# Patient Record
Sex: FEMALE | Race: WHITE | Employment: OTHER | URBAN - METROPOLITAN AREA
[De-identification: names, ages, dates, MRNs, and addresses within clinical notes are randomized per-mention and may not be internally consistent; named-entity substitution may affect disease eponyms.]

---

## 2017-01-12 ENCOUNTER — HOSPITAL ENCOUNTER (OUTPATIENT)
Dept: MAMMOGRAPHY | Age: 62
Discharge: HOME | End: 2017-01-12
Attending: OBSTETRICS & GYNECOLOGY
Payer: MEDICARE

## 2017-01-12 DIAGNOSIS — Z12.31 VISIT FOR SCREENING MAMMOGRAM: ICD-10-CM

## 2017-01-12 PROCEDURE — 77067 SCR MAMMO BI INCL CAD: CPT

## 2017-04-27 PROBLEM — E03.9 ACQUIRED HYPOTHYROIDISM: Chronic | Status: ACTIVE | Noted: 2017-04-27

## 2017-04-27 PROBLEM — E78.00 PURE HYPERCHOLESTEROLEMIA: Chronic | Status: ACTIVE | Noted: 2017-04-27

## 2017-07-24 ENCOUNTER — HOSPITAL ENCOUNTER (OUTPATIENT)
Dept: CT IMAGING | Age: 62
Discharge: HOME | End: 2017-07-24
Attending: SURGERY
Payer: MEDICARE

## 2017-07-24 VITALS — HEIGHT: 67 IN | BODY MASS INDEX: 21.19 KG/M2 | WEIGHT: 135 LBS

## 2017-07-24 DIAGNOSIS — E78.00 PURE HYPERCHOLESTEROLEMIA: Chronic | ICD-10-CM

## 2017-07-24 DIAGNOSIS — I75.022 ATHEROEMBOLISM OF LEFT LOWER EXTREMITY (HCC): Chronic | ICD-10-CM

## 2017-07-24 DIAGNOSIS — I73.00 RAYNAUD'S PHENOMENON WITHOUT GANGRENE: Chronic | ICD-10-CM

## 2017-07-24 DIAGNOSIS — I70.0 ATHEROSCLEROSIS OF ABDOMINAL AORTA (HCC): Chronic | ICD-10-CM

## 2017-07-24 DIAGNOSIS — I72.0 CAROTID ANEURYSM, LEFT (HCC): ICD-10-CM

## 2017-07-24 DIAGNOSIS — I65.23 BILATERAL CAROTID ARTERY STENOSIS: ICD-10-CM

## 2017-07-24 DIAGNOSIS — F17.210 NICOTINE DEPENDENCE, CIGARETTES, UNCOMPLICATED: Chronic | ICD-10-CM

## 2017-07-24 PROCEDURE — 74011636320 HC RX REV CODE- 636/320: Performed by: SURGERY

## 2017-07-24 PROCEDURE — 70498 CT ANGIOGRAPHY NECK: CPT

## 2017-07-24 PROCEDURE — 74011000258 HC RX REV CODE- 258: Performed by: SURGERY

## 2017-07-24 RX ORDER — SODIUM CHLORIDE 0.9 % (FLUSH) 0.9 %
10 SYRINGE (ML) INJECTION
Status: COMPLETED | OUTPATIENT
Start: 2017-07-24 | End: 2017-07-24

## 2017-07-24 RX ADMIN — SODIUM CHLORIDE 100 ML: 900 INJECTION, SOLUTION INTRAVENOUS at 10:02

## 2017-07-24 RX ADMIN — Medication 10 ML: at 10:02

## 2017-07-24 RX ADMIN — IOPAMIDOL 80 ML: 755 INJECTION, SOLUTION INTRAVENOUS at 10:02

## 2017-09-21 ENCOUNTER — HOSPITAL ENCOUNTER (OUTPATIENT)
Dept: MRI IMAGING | Age: 62
Discharge: HOME | End: 2017-09-21
Attending: FAMILY MEDICINE
Payer: MEDICARE

## 2017-09-21 VITALS — BODY MASS INDEX: 21.3 KG/M2 | WEIGHT: 136 LBS

## 2017-09-21 DIAGNOSIS — R42 DIZZINESS: ICD-10-CM

## 2017-09-21 DIAGNOSIS — H93.12 RINGING IN LEFT EAR: ICD-10-CM

## 2017-09-21 LAB — CREAT BLD-MCNC: 1 MG/DL (ref 0.8–1.5)

## 2017-09-21 PROCEDURE — 70553 MRI BRAIN STEM W/O & W/DYE: CPT

## 2017-09-21 PROCEDURE — A9577 INJ MULTIHANCE: HCPCS | Performed by: FAMILY MEDICINE

## 2017-09-21 PROCEDURE — 82565 ASSAY OF CREATININE: CPT

## 2017-09-21 PROCEDURE — 74011250636 HC RX REV CODE- 250/636: Performed by: FAMILY MEDICINE

## 2017-09-21 RX ORDER — SODIUM CHLORIDE 0.9 % (FLUSH) 0.9 %
10 SYRINGE (ML) INJECTION
Status: COMPLETED | OUTPATIENT
Start: 2017-09-21 | End: 2017-09-21

## 2017-09-21 RX ADMIN — Medication 10 ML: at 18:23

## 2017-09-21 RX ADMIN — GADOBENATE DIMEGLUMINE 12 ML: 529 INJECTION, SOLUTION INTRAVENOUS at 18:23

## 2017-11-03 ENCOUNTER — HOSPITAL ENCOUNTER (EMERGENCY)
Age: 62
Discharge: HOME | End: 2017-11-03
Attending: EMERGENCY MEDICINE
Payer: MEDICARE

## 2017-11-03 ENCOUNTER — APPOINTMENT (OUTPATIENT)
Dept: GENERAL RADIOLOGY | Age: 62
End: 2017-11-03
Attending: EMERGENCY MEDICINE
Payer: MEDICARE

## 2017-11-03 VITALS
WEIGHT: 137 LBS | RESPIRATION RATE: 18 BRPM | HEART RATE: 98 BPM | HEIGHT: 67 IN | DIASTOLIC BLOOD PRESSURE: 79 MMHG | TEMPERATURE: 99 F | OXYGEN SATURATION: 99 % | BODY MASS INDEX: 21.5 KG/M2 | SYSTOLIC BLOOD PRESSURE: 118 MMHG

## 2017-11-03 DIAGNOSIS — S93.402A SPRAIN OF LEFT ANKLE, UNSPECIFIED LIGAMENT, INITIAL ENCOUNTER: Primary | ICD-10-CM

## 2017-11-03 PROCEDURE — 99283 EMERGENCY DEPT VISIT LOW MDM: CPT | Performed by: EMERGENCY MEDICINE

## 2017-11-03 PROCEDURE — 73630 X-RAY EXAM OF FOOT: CPT

## 2017-11-03 PROCEDURE — 73610 X-RAY EXAM OF ANKLE: CPT

## 2017-11-03 PROCEDURE — 75810000053 HC SPLINT APPLICATION: Performed by: EMERGENCY MEDICINE

## 2017-11-03 PROCEDURE — L4350 ANKLE CONTROL ORTHO PRE OTS: HCPCS

## 2017-11-03 RX ORDER — ONDANSETRON 8 MG/1
8 TABLET, ORALLY DISINTEGRATING ORAL
Qty: 10 TAB | Refills: 0 | Status: SHIPPED | OUTPATIENT
Start: 2017-11-03 | End: 2018-03-09 | Stop reason: SDUPTHER

## 2017-11-03 RX ORDER — TRAMADOL HYDROCHLORIDE 50 MG/1
50 TABLET ORAL
Qty: 10 TAB | Refills: 0 | Status: SHIPPED | OUTPATIENT
Start: 2017-11-03 | End: 2018-04-27

## 2017-11-03 RX ORDER — AMLODIPINE BESYLATE 2.5 MG/1
2.5 TABLET ORAL DAILY
COMMUNITY
End: 2018-04-27 | Stop reason: SDUPTHER

## 2017-11-04 NOTE — ED NOTES
I have reviewed discharge instructions with the patient. The patient and spouse verbalized understanding. Patient left ED via Discharge Method: ambulatory to Home with self      Opportunity for questions and clarification provided. Patient given 2 scripts.

## 2017-11-04 NOTE — ED PROVIDER NOTES
HPI Comments: 58-year-old white female presents with left ankle pain after tripping over a vacuum  approximately 2 hours ago. Minor abrasions to left hand and forearm but no other complaints. She has pain when she attempts to bear weight. Patient is a 60 y.o. female presenting with ankle problem. The history is provided by the patient. Ankle Injury    Pertinent negatives include no back pain and no neck pain. Past Medical History:   Diagnosis Date    Anxiety     SEVERE    Arthritis     Depression 05    Fibrocystic breast     Gallbladder disorder     GERD (gastroesophageal reflux disease)     - no current meds - does not sleep elevated- states hx of NISSEN FUND    H/O vaginitis 11/3/06    H/O atrophic vaginitis    Headache(784.0)     Hematuria 01    Herpes 4/13/10    Hypercholesterolemia     hereditary    Mastalgia 3/20/01    H/O abn mammogram     PUD (peptic ulcer disease)     hx of PUD -     Raynaud's disease     raynauds    Smoker     1 pk/day x 42 years-- cut back to 2-3 cig daily    Urinary incontinence 04    Varicose vein     Vulvar pruritus 2014       Past Surgical History:   Procedure Laterality Date    CYSTOSCOPY,RESEC EJACULATORY DUCT      HX APPENDECTOMY  1971    HX BREAST RECONSTRUCTION Bilateral     BREAST LIFT/ NOT FOR CA    HX  SECTION  1993    Twins    HX CHOLECYSTECTOMY  2010    HX COLONOSCOPY  -?     HX GI  2010    mel fundoplication    HX GYN  1982    D&C miscarriage    HX HEENT      fractured nose    HX HYSTERECTOMY  1984    HX KNEE ARTHROSCOPY Right 2005    knee scope    HX LUMBAR FUSION  2007    HX MASTOPEXY (BREAST LIFT)          HX OVARIAN CYST REMOVAL  1980    Left    HX SALPINGO-OOPHORECTOMY      right    HX THORACIC LAMINECTOMY      ??    VASCULAR SURGERY PROCEDURE UNLIST Left 16    arteriogram         Family History:   Problem Relation Age of Onset    Heart Disease Mother  Cancer Mother      breast    Kidney Disease Mother     Dementia Mother     Breast Cancer Mother 54    Hypertension Father     Diabetes Father     Cancer Paternal Grandmother      breast, colon    Breast Cancer Paternal Grandmother     Stroke Paternal Grandfather     No Known Problems Maternal Grandmother     No Known Problems Maternal Grandfather        Social History     Social History    Marital status:      Spouse name: N/A    Number of children: N/A    Years of education: N/A     Occupational History    Not on file. Social History Main Topics    Smoking status: Current Some Day Smoker     Packs/day: 1.00     Years: 30.00     Types: Cigarettes    Smokeless tobacco: Never Used      Comment: cut back to 2-3 cig daily--    Alcohol use 0.6 oz/week     1 Standard drinks or equivalent per week      Comment: seldom    Drug use: No    Sexual activity: Yes     Partners: Male     Birth control/ protection: Surgical     Other Topics Concern    Not on file     Social History Narrative         ALLERGIES: Codeine and Compazine [prochlorperazine edisylate]    Review of Systems   Cardiovascular: Negative for chest pain. Gastrointestinal: Negative for abdominal pain. Musculoskeletal: Negative for back pain and neck pain. Neurological: Negative for headaches. Vitals:    11/03/17 2141   BP: 115/71   Pulse: (!) 103   Resp: 16   SpO2: 99%   Weight: 62.1 kg (137 lb)   Height: 5' 7\" (1.702 m)            Physical Exam   Constitutional: She is oriented to person, place, and time. She appears well-developed and well-nourished. No distress. HENT:   Head: Normocephalic and atraumatic. Cardiovascular: Normal rate. Pulmonary/Chest: Effort normal.   Musculoskeletal:   Left ankle has diffuse swelling with tenderness to the lateral malleolus. Foot has no tenderness. Good pulses and sensation. Knee is nontender. Neurological: She is alert and oriented to person, place, and time.    Skin: Skin is warm and dry. Psychiatric: She has a normal mood and affect. Nursing note and vitals reviewed. MDM  Number of Diagnoses or Management Options  Sprain of left ankle, unspecified ligament, initial encounter:   Diagnosis management comments: X-ray shows no acute fracture. Patient placed in an ankle splint and given crutches. Advised follow-up with primary care next week for recheck.        Amount and/or Complexity of Data Reviewed  Tests in the radiology section of CPT®: ordered and reviewed    Risk of Complications, Morbidity, and/or Mortality  Presenting problems: low  Diagnostic procedures: low  Management options: low      ED Course       Procedures

## 2017-11-04 NOTE — DISCHARGE INSTRUCTIONS
Ankle Sprain: Care Instructions  Your Care Instructions    An ankle sprain can happen when you twist your ankle. The ligaments that support the ankle can get stretched and torn. Often the ankle is swollen and painful. Ankle sprains may take from several weeks to several months to heal. Usually, the more pain and swelling you have, the more severe your ankle sprain is and the longer it will take to heal. You can heal faster and regain strength in your ankle with good home treatment. It is very important to give your ankle time to heal completely, so that you do not easily hurt your ankle again. Follow-up care is a key part of your treatment and safety. Be sure to make and go to all appointments, and call your doctor if you are having problems. It's also a good idea to know your test results and keep a list of the medicines you take. How can you care for yourself at home? · Prop up your foot on pillows as much as possible for the next 3 days. Try to keep your ankle above the level of your heart. This will help reduce the swelling. · Follow your doctor's directions for wearing a splint or elastic bandage. Wrapping the ankle may help reduce or prevent swelling. · Your doctor may give you a splint, a brace, an air stirrup, or another form of ankle support to protect your ankle until it is healed. Wear it as directed while your ankle is healing. Do not remove it unless your doctor tells you to. After your ankle has healed, ask your doctor whether you should wear the brace when you exercise. · Put ice or cold packs on your injured ankle for 10 to 20 minutes at a time. Try to do this every 1 to 2 hours for the next 3 days (when you are awake) or until the swelling goes down. Put a thin cloth between the ice and your skin. · You may need to use crutches until you can walk without pain. If you do use crutches, try to bear some weight on your injured ankle if you can do so without pain.  This helps the ankle heal.  · Take pain medicines exactly as directed. ¨ If the doctor gave you a prescription medicine for pain, take it as prescribed. ¨ If you are not taking a prescription pain medicine, ask your doctor if you can take an over-the-counter medicine. · If you have been given ankle exercises to do at home, do them exactly as instructed. These can promote healing and help prevent lasting weakness. When should you call for help? Call your doctor now or seek immediate medical care if:  ? · Your pain is getting worse. ? · Your swelling is getting worse. ? · Your splint feels too tight or you are unable to loosen it. ? Watch closely for changes in your health, and be sure to contact your doctor if:  ? · You are not getting better after 1 week. Where can you learn more? Go to http://damaso-patrick.info/. Enter I844 in the search box to learn more about \"Ankle Sprain: Care Instructions. \"  Current as of: March 21, 2017  Content Version: 11.4  © 8577-0710 [employer]. Care instructions adapted under license by [employer] (which disclaims liability or warranty for this information). If you have questions about a medical condition or this instruction, always ask your healthcare professional. [address] any warranty or liability for your use of this information.

## 2017-11-04 NOTE — ED TRIAGE NOTES
Pt states that she fell and twisted her left ankle. Swelling noted to the left ankle.   Several skin tears to the left arm

## 2017-11-08 ENCOUNTER — APPOINTMENT (OUTPATIENT)
Dept: CT IMAGING | Age: 62
End: 2017-11-08
Attending: EMERGENCY MEDICINE
Payer: MEDICARE

## 2017-11-08 ENCOUNTER — HOSPITAL ENCOUNTER (EMERGENCY)
Age: 62
Discharge: HOME | End: 2017-11-08
Attending: EMERGENCY MEDICINE
Payer: MEDICARE

## 2017-11-08 ENCOUNTER — APPOINTMENT (OUTPATIENT)
Dept: GENERAL RADIOLOGY | Age: 62
End: 2017-11-08
Attending: EMERGENCY MEDICINE
Payer: MEDICARE

## 2017-11-08 VITALS
HEIGHT: 67 IN | SYSTOLIC BLOOD PRESSURE: 141 MMHG | BODY MASS INDEX: 21.5 KG/M2 | OXYGEN SATURATION: 93 % | RESPIRATION RATE: 18 BRPM | WEIGHT: 137 LBS | TEMPERATURE: 97.7 F | DIASTOLIC BLOOD PRESSURE: 70 MMHG

## 2017-11-08 DIAGNOSIS — R07.89 ACUTE CHEST WALL PAIN: ICD-10-CM

## 2017-11-08 DIAGNOSIS — R07.9 ACUTE CHEST PAIN: Primary | ICD-10-CM

## 2017-11-08 LAB
ALBUMIN SERPL-MCNC: 3.2 G/DL (ref 3.2–4.6)
ALBUMIN/GLOB SERPL: 1 {RATIO} (ref 1.2–3.5)
ALP SERPL-CCNC: 84 U/L (ref 50–136)
ALT SERPL-CCNC: 16 U/L (ref 12–65)
ANION GAP SERPL CALC-SCNC: 10 MMOL/L (ref 7–16)
AST SERPL-CCNC: 17 U/L (ref 15–37)
ATRIAL RATE: 69 BPM
BASOPHILS # BLD: 0 K/UL (ref 0–0.2)
BASOPHILS NFR BLD: 0 % (ref 0–2)
BILIRUB SERPL-MCNC: 0.4 MG/DL (ref 0.2–1.1)
BUN SERPL-MCNC: 4 MG/DL (ref 8–23)
CALCIUM SERPL-MCNC: 8.9 MG/DL (ref 8.3–10.4)
CALCULATED P AXIS, ECG09: 77 DEGREES
CALCULATED R AXIS, ECG10: 70 DEGREES
CALCULATED T AXIS, ECG11: 65 DEGREES
CHLORIDE SERPL-SCNC: 104 MMOL/L (ref 98–107)
CO2 SERPL-SCNC: 28 MMOL/L (ref 21–32)
CREAT SERPL-MCNC: 1.1 MG/DL (ref 0.6–1)
D DIMER PPP FEU-MCNC: 0.66 UG/ML(FEU)
DIAGNOSIS, 93000: NORMAL
DIFFERENTIAL METHOD BLD: ABNORMAL
EOSINOPHIL # BLD: 0.1 K/UL (ref 0–0.8)
EOSINOPHIL NFR BLD: 1 % (ref 0.5–7.8)
ERYTHROCYTE [DISTWIDTH] IN BLOOD BY AUTOMATED COUNT: 13.7 % (ref 11.9–14.6)
GLOBULIN SER CALC-MCNC: 3.2 G/DL (ref 2.3–3.5)
GLUCOSE SERPL-MCNC: 96 MG/DL (ref 65–100)
HCT VFR BLD AUTO: 36.8 % (ref 35.8–46.3)
HGB BLD-MCNC: 12.2 G/DL (ref 11.7–15.4)
IMM GRANULOCYTES # BLD: 0 K/UL (ref 0–0.5)
IMM GRANULOCYTES NFR BLD: 0 % (ref 0–5)
INR PPP: 1 (ref 0.9–1.2)
LYMPHOCYTES # BLD: 2.3 K/UL (ref 0.5–4.6)
LYMPHOCYTES NFR BLD: 23 % (ref 13–44)
MCH RBC QN AUTO: 29 PG (ref 26.1–32.9)
MCHC RBC AUTO-ENTMCNC: 33.2 G/DL (ref 31.4–35)
MCV RBC AUTO: 87.4 FL (ref 79.6–97.8)
MONOCYTES # BLD: 0.6 K/UL (ref 0.1–1.3)
MONOCYTES NFR BLD: 6 % (ref 4–12)
NEUTS SEG # BLD: 7.2 K/UL (ref 1.7–8.2)
NEUTS SEG NFR BLD: 70 % (ref 43–78)
P-R INTERVAL, ECG05: 176 MS
PLATELET # BLD AUTO: 329 K/UL (ref 150–450)
PMV BLD AUTO: 10.4 FL (ref 10.8–14.1)
POTASSIUM SERPL-SCNC: 3.7 MMOL/L (ref 3.5–5.1)
PROT SERPL-MCNC: 6.4 G/DL (ref 6.3–8.2)
PROTHROMBIN TIME: 10.4 SEC (ref 9.6–12)
Q-T INTERVAL, ECG07: 380 MS
QRS DURATION, ECG06: 82 MS
QTC CALCULATION (BEZET), ECG08: 407 MS
RBC # BLD AUTO: 4.21 M/UL (ref 4.05–5.25)
SODIUM SERPL-SCNC: 142 MMOL/L (ref 136–145)
TROPONIN I BLD-MCNC: 0 NG/ML (ref 0.02–0.05)
VENTRICULAR RATE, ECG03: 69 BPM
WBC # BLD AUTO: 10.2 K/UL (ref 4.3–11.1)

## 2017-11-08 PROCEDURE — 80053 COMPREHEN METABOLIC PANEL: CPT | Performed by: EMERGENCY MEDICINE

## 2017-11-08 PROCEDURE — 96361 HYDRATE IV INFUSION ADD-ON: CPT | Performed by: EMERGENCY MEDICINE

## 2017-11-08 PROCEDURE — 74011000258 HC RX REV CODE- 258: Performed by: EMERGENCY MEDICINE

## 2017-11-08 PROCEDURE — 74011250636 HC RX REV CODE- 250/636: Performed by: EMERGENCY MEDICINE

## 2017-11-08 PROCEDURE — 85025 COMPLETE CBC W/AUTO DIFF WBC: CPT | Performed by: EMERGENCY MEDICINE

## 2017-11-08 PROCEDURE — 71020 XR CHEST PA LAT: CPT

## 2017-11-08 PROCEDURE — 93005 ELECTROCARDIOGRAM TRACING: CPT | Performed by: EMERGENCY MEDICINE

## 2017-11-08 PROCEDURE — 74011636320 HC RX REV CODE- 636/320: Performed by: EMERGENCY MEDICINE

## 2017-11-08 PROCEDURE — 85379 FIBRIN DEGRADATION QUANT: CPT | Performed by: EMERGENCY MEDICINE

## 2017-11-08 PROCEDURE — 84484 ASSAY OF TROPONIN QUANT: CPT

## 2017-11-08 PROCEDURE — 71260 CT THORAX DX C+: CPT

## 2017-11-08 PROCEDURE — 99283 EMERGENCY DEPT VISIT LOW MDM: CPT | Performed by: EMERGENCY MEDICINE

## 2017-11-08 PROCEDURE — 96374 THER/PROPH/DIAG INJ IV PUSH: CPT | Performed by: EMERGENCY MEDICINE

## 2017-11-08 PROCEDURE — 85610 PROTHROMBIN TIME: CPT | Performed by: EMERGENCY MEDICINE

## 2017-11-08 RX ORDER — SODIUM CHLORIDE 0.9 % (FLUSH) 0.9 %
10 SYRINGE (ML) INJECTION
Status: COMPLETED | OUTPATIENT
Start: 2017-11-08 | End: 2017-11-08

## 2017-11-08 RX ORDER — METHYLPREDNISOLONE 4 MG/1
TABLET ORAL
Qty: 1 DOSE PACK | Refills: 0 | Status: SHIPPED | OUTPATIENT
Start: 2017-11-08 | End: 2018-03-09

## 2017-11-08 RX ORDER — SODIUM CHLORIDE 0.9 % (FLUSH) 0.9 %
5-10 SYRINGE (ML) INJECTION AS NEEDED
Status: DISCONTINUED | OUTPATIENT
Start: 2017-11-08 | End: 2017-11-08 | Stop reason: HOSPADM

## 2017-11-08 RX ORDER — DEXAMETHASONE SODIUM PHOSPHATE 100 MG/10ML
10 INJECTION INTRAMUSCULAR; INTRAVENOUS
Status: COMPLETED | OUTPATIENT
Start: 2017-11-08 | End: 2017-11-08

## 2017-11-08 RX ORDER — HYDROCODONE BITARTRATE AND ACETAMINOPHEN 5; 325 MG/1; MG/1
1 TABLET ORAL
Qty: 15 TAB | Refills: 0 | Status: SHIPPED | OUTPATIENT
Start: 2017-11-08 | End: 2018-03-09

## 2017-11-08 RX ORDER — ALBUTEROL SULFATE 90 UG/1
2 AEROSOL, METERED RESPIRATORY (INHALATION)
Qty: 1 INHALER | Refills: 0 | Status: SHIPPED | OUTPATIENT
Start: 2017-11-08 | End: 2017-11-15

## 2017-11-08 RX ADMIN — SODIUM CHLORIDE 1000 ML: 900 INJECTION, SOLUTION INTRAVENOUS at 12:01

## 2017-11-08 RX ADMIN — SODIUM CHLORIDE 100 ML: 900 INJECTION, SOLUTION INTRAVENOUS at 12:18

## 2017-11-08 RX ADMIN — DEXAMETHASONE SODIUM PHOSPHATE 10 MG: 10 INJECTION INTRAMUSCULAR; INTRAVENOUS at 13:02

## 2017-11-08 RX ADMIN — Medication 10 ML: at 12:18

## 2017-11-08 RX ADMIN — IOPAMIDOL 100 ML: 755 INJECTION, SOLUTION INTRAVENOUS at 12:18

## 2017-11-08 NOTE — ED NOTES
I have reviewed discharge instructions with the patient and spouse. The patient and spouse verbalized understanding. Patient left ED via Discharge Method: ambulatory to Home with spouse. Opportunity for questions and clarification provided. Patient given 2 scripts.

## 2017-11-08 NOTE — ED PROVIDER NOTES
HPI Comments: Patient complains of upper respiratory symptoms and cough for the last 3 weeks. Prostate week ago, the patient started noticing different discomfort behind her right knee particularly when she sat down to drive. Over the past week she has developed some left-sided chest discomfort, worse with deep inspiration and palpation and with cough. Sputum remains clear and the patient denies fever. She went to urgent care today and due to the concern of a possible PE, the patient was sent to the emergency department for further evaluation. Patient is a 60 y.o. female presenting with chest pain. The history is provided by the patient, a relative and the spouse. Chest Pain (Angina)    This is a new problem. The current episode started more than 2 days ago. The problem has been gradually worsening. Duration of episode(s) is 1 week. The problem occurs constantly. The pain is associated with normal activity. The pain is present in the lateral region and left side. The pain is at a severity of 4/10. The quality of the pain is described as sharp. The pain does not radiate. The symptoms are aggravated by palpation and deep breathing. Associated symptoms include cough (complains of cough for the past 3 weeks, productive of whitish sputum), leg pain and shortness of breath. Pertinent negatives include no abdominal pain, no back pain, no claudication, no diaphoresis, no dizziness, no exertional chest pressure, no fever, no headaches, no hemoptysis, no irregular heartbeat, no lower extremity edema, no malaise/fatigue, no nausea, no near-syncope, no numbness, no orthopnea, no palpitations, no PND, no sputum production, no vomiting and no weakness. She has tried rest for the symptoms. The treatment provided no relief. Risk factors include smoking/tobacco exposure. Her past medical history is significant for HTN. Her past medical history does not include aneurysm, cancer, DM, DVT, PE or CHF.  Past workup comments: aortobifem. Past Medical History:   Diagnosis Date    Anxiety     SEVERE    Arthritis     Depression 05    Fibrocystic breast     Gallbladder disorder     GERD (gastroesophageal reflux disease)     - no current meds - does not sleep elevated- states hx of NISSEN FUND    H/O vaginitis 11/3/06    H/O atrophic vaginitis    Headache(784.0)     Hematuria 01    Herpes 4/13/10    Hypercholesterolemia     hereditary    Mastalgia 3/20/01    H/O abn mammogram     PUD (peptic ulcer disease)     hx of PUD -     Raynaud's disease     raynauds    Smoker     1 pk/day x 42 years-- cut back to 2-3 cig daily    Urinary incontinence 04    Varicose vein     Vulvar pruritus 2014       Past Surgical History:   Procedure Laterality Date    CYSTOSCOPY,RESEC EJACULATORY DUCT      HX APPENDECTOMY  1971    HX BREAST RECONSTRUCTION Bilateral     BREAST LIFT/ NOT FOR CA    HX  SECTION  1993    Twins    HX CHOLECYSTECTOMY      HX COLONOSCOPY  -?     HX GI  2010    mel fundoplication    HX GYN  1982    D&C miscarriage    HX HEENT      fractured nose    HX HYSTERECTOMY  1984    HX KNEE ARTHROSCOPY Right 2005    knee scope    HX LUMBAR FUSION  2007    HX MASTOPEXY (BREAST LIFT)      2004    HX OVARIAN CYST REMOVAL  1980    Left    HX SALPINGO-OOPHORECTOMY  1976    right    HX THORACIC LAMINECTOMY      ??    VASCULAR SURGERY PROCEDURE UNLIST Left 16    arteriogram         Family History:   Problem Relation Age of Onset    Heart Disease Mother     Cancer Mother      breast    Kidney Disease Mother     Dementia Mother     Breast Cancer Mother 54    Hypertension Father     Diabetes Father     Cancer Paternal Grandmother      breast, colon    Breast Cancer Paternal Grandmother     Stroke Paternal Grandfather     No Known Problems Maternal Grandmother     No Known Problems Maternal Grandfather        Social History     Social History    Marital status:      Spouse name: N/A    Number of children: N/A    Years of education: N/A     Occupational History    Not on file. Social History Main Topics    Smoking status: Current Some Day Smoker     Packs/day: 1.00     Years: 30.00     Types: Cigarettes    Smokeless tobacco: Never Used      Comment: cut back to 2-3 cig daily--    Alcohol use 0.6 oz/week     1 Standard drinks or equivalent per week      Comment: seldom    Drug use: No    Sexual activity: Yes     Partners: Male     Birth control/ protection: Surgical     Other Topics Concern    Not on file     Social History Narrative         ALLERGIES: Codeine and Compazine [prochlorperazine edisylate]    Review of Systems   Constitutional: Negative for diaphoresis, fever and malaise/fatigue. Respiratory: Positive for cough (complains of cough for the past 3 weeks, productive of whitish sputum) and shortness of breath. Negative for hemoptysis, sputum production and wheezing. Cardiovascular: Positive for chest pain. Negative for palpitations, orthopnea, claudication, leg swelling, PND and near-syncope. Gastrointestinal: Negative for abdominal pain, nausea and vomiting. Musculoskeletal: Negative for back pain. Neurological: Negative for dizziness, weakness, numbness and headaches. All other systems reviewed and are negative. Vitals:    11/08/17 1046   BP: 146/76   Resp: 18   Temp: 97.7 °F (36.5 °C)   SpO2: 98%   Weight: 62.1 kg (137 lb)   Height: 5' 7\" (1.702 m)            Physical Exam   Constitutional: She is oriented to person, place, and time. She appears well-developed and well-nourished. No distress. HENT:   Head: Normocephalic and atraumatic. Right Ear: Tympanic membrane and external ear normal.   Left Ear: Tympanic membrane and external ear normal.   Mouth/Throat: Oropharynx is clear and moist.   Eyes: Conjunctivae and EOM are normal. Pupils are equal, round, and reactive to light.    Neck: Normal range of motion. Neck supple. No tracheal deviation present. Cardiovascular: Normal rate, regular rhythm, normal heart sounds and intact distal pulses. Exam reveals no gallop and no friction rub. No murmur heard. Pulmonary/Chest: Effort normal. No respiratory distress. She has wheezes (occasional scattered). She has no rales. She exhibits tenderness. Abdominal: Soft. Bowel sounds are normal. She exhibits no distension and no mass. There is no hepatosplenomegaly. There is no tenderness. There is no rebound and no guarding. Musculoskeletal: Normal range of motion. She exhibits no edema. Lymphadenopathy:     She has no cervical adenopathy. Neurological: She is alert and oriented to person, place, and time. She displays normal reflexes. No cranial nerve deficit. Skin: Skin is warm and dry. No rash noted. She is not diaphoretic. No erythema. Psychiatric: She has a normal mood and affect. Nursing note and vitals reviewed.        MDM  Number of Diagnoses or Management Options  Acute chest pain: new and requires workup  Acute chest wall pain: new and requires workup     Amount and/or Complexity of Data Reviewed  Clinical lab tests: ordered and reviewed  Tests in the radiology section of CPT®: ordered and reviewed  Decide to obtain previous medical records or to obtain history from someone other than the patient: yes  Obtain history from someone other than the patient: yes  Review and summarize past medical records: yes  Independent visualization of images, tracings, or specimens: yes    Risk of Complications, Morbidity, and/or Mortality  Presenting problems: high  Diagnostic procedures: high  Management options: moderate    Patient Progress  Patient progress: improved    ED Course       Procedures        Results Reviewed:      Recent Results (from the past 24 hour(s))   EKG, 12 LEAD, INITIAL    Collection Time: 11/08/17 11:02 AM   Result Value Ref Range    Ventricular Rate 69 BPM    Atrial Rate 69 BPM    P-R Interval 176 ms    QRS Duration 82 ms    Q-T Interval 380 ms    QTC Calculation (Bezet) 407 ms    Calculated P Axis 77 degrees    Calculated R Axis 70 degrees    Calculated T Axis 65 degrees    Diagnosis       !! AGE AND GENDER SPECIFIC ECG ANALYSIS !! Normal sinus rhythm  Normal ECG  When compared with ECG of 17-NOV-2014 22:11,  Questionable change in QRS axis  T wave amplitude has increased in Inferior leads     CBC WITH AUTOMATED DIFF    Collection Time: 11/08/17 11:16 AM   Result Value Ref Range    WBC 10.2 4.3 - 11.1 K/uL    RBC 4.21 4.05 - 5.25 M/uL    HGB 12.2 11.7 - 15.4 g/dL    HCT 36.8 35.8 - 46.3 %    MCV 87.4 79.6 - 97.8 FL    MCH 29.0 26.1 - 32.9 PG    MCHC 33.2 31.4 - 35.0 g/dL    RDW 13.7 11.9 - 14.6 %    PLATELET 473 509 - 236 K/uL    MPV 10.4 (L) 10.8 - 14.1 FL    DF AUTOMATED      NEUTROPHILS 70 43 - 78 %    LYMPHOCYTES 23 13 - 44 %    MONOCYTES 6 4.0 - 12.0 %    EOSINOPHILS 1 0.5 - 7.8 %    BASOPHILS 0 0.0 - 2.0 %    IMMATURE GRANULOCYTES 0 0.0 - 5.0 %    ABS. NEUTROPHILS 7.2 1.7 - 8.2 K/UL    ABS. LYMPHOCYTES 2.3 0.5 - 4.6 K/UL    ABS. MONOCYTES 0.6 0.1 - 1.3 K/UL    ABS. EOSINOPHILS 0.1 0.0 - 0.8 K/UL    ABS. BASOPHILS 0.0 0.0 - 0.2 K/UL    ABS. IMM. GRANS. 0.0 0.0 - 0.5 K/UL   PROTHROMBIN TIME + INR    Collection Time: 11/08/17 11:16 AM   Result Value Ref Range    Prothrombin time 10.4 9.6 - 12.0 sec    INR 1.0 0.9 - 1.2     METABOLIC PANEL, COMPREHENSIVE    Collection Time: 11/08/17 11:16 AM   Result Value Ref Range    Sodium 142 136 - 145 mmol/L    Potassium 3.7 3.5 - 5.1 mmol/L    Chloride 104 98 - 107 mmol/L    CO2 28 21 - 32 mmol/L    Anion gap 10 7 - 16 mmol/L    Glucose 96 65 - 100 mg/dL    BUN 4 (L) 8 - 23 MG/DL    Creatinine 1.10 (H) 0.6 - 1.0 MG/DL    GFR est AA >60 >60 ml/min/1.73m2    GFR est non-AA 53 (L) >60 ml/min/1.73m2    Calcium 8.9 8.3 - 10.4 MG/DL    Bilirubin, total 0.4 0.2 - 1.1 MG/DL    ALT (SGPT) 16 12 - 65 U/L    AST (SGOT) 17 15 - 37 U/L    Alk.  phosphatase 84 50 - 136 U/L    Protein, total 6.4 6.3 - 8.2 g/dL    Albumin 3.2 3.2 - 4.6 g/dL    Globulin 3.2 2.3 - 3.5 g/dL    A-G Ratio 1.0 (L) 1.2 - 3.5     D DIMER    Collection Time: 11/08/17 11:16 AM   Result Value Ref Range    D DIMER 0.66 (HH) <0.55 ug/ml(FEU)   POC TROPONIN-I    Collection Time: 11/08/17 11:27 AM   Result Value Ref Range    Troponin-I (POC) 0 (L) 0.02 - 0.05 ng/ml     CT CHEST W CONT   Final Result   IMPRESSION:    No acute pathology identified. No evidence of pulmonary embolus. Other   incidental findings as above. XR CHEST PA LAT   Final Result   Impression:  Nonspecific increased mild diffuse interstitial prominence. I discussed the results of all labs, procedures, radiographs, and treatments with the patient and available family. Treatment plan is agreed upon and the patient is ready for discharge. All voiced understanding of the discharge plan and medication instructions or changes as appropriate. Questions about treatment in the ED were answered. All were encouraged to return should symptoms worsen or new problems develop.

## 2017-11-08 NOTE — DISCHARGE INSTRUCTIONS
Chest Pain: Care Instructions  Your Care Instructions    There are many things that can cause chest pain. Some are not serious and will get better on their own in a few days. But some kinds of chest pain need more testing and treatment. Your doctor may have recommended a follow-up visit in the next 8 to 12 hours. If you are not getting better, you may need more tests or treatment. Even though your doctor has released you, you still need to watch for any problems. The doctor carefully checked you, but sometimes problems can develop later. If you have new symptoms or if your symptoms do not get better, get medical care right away. If you have worse or different chest pain or pressure that lasts more than 5 minutes or you passed out (lost consciousness), call 911 or seek other emergency help right away. A medical visit is only one step in your treatment. Even if you feel better, you still need to do what your doctor recommends, such as going to all suggested follow-up appointments and taking medicines exactly as directed. This will help you recover and help prevent future problems. How can you care for yourself at home? · Rest until you feel better. · Take your medicine exactly as prescribed. Call your doctor if you think you are having a problem with your medicine. · Do not drive after taking a prescription pain medicine. When should you call for help? Call 911 if:  ? · You passed out (lost consciousness). ? · You have severe difficulty breathing. ? · You have symptoms of a heart attack. These may include:  ¨ Chest pain or pressure, or a strange feeling in your chest.  ¨ Sweating. ¨ Shortness of breath. ¨ Nausea or vomiting. ¨ Pain, pressure, or a strange feeling in your back, neck, jaw, or upper belly or in one or both shoulders or arms. ¨ Lightheadedness or sudden weakness. ¨ A fast or irregular heartbeat.   After you call 911, the  may tell you to chew 1 adult-strength or 2 to 4 low-dose aspirin. Wait for an ambulance. Do not try to drive yourself. ?Call your doctor today if:  ? · You have any trouble breathing. ? · Your chest pain gets worse. ? · You are dizzy or lightheaded, or you feel like you may faint. ? · You are not getting better as expected. ? · You are having new or different chest pain. Where can you learn more? Go to http://damaso-patrick.info/. Enter A120 in the search box to learn more about \"Chest Pain: Care Instructions. \"  Current as of: March 20, 2017  Content Version: 11.4  © 6042-1282 [employer]. Care instructions adapted under license by [employer] (which disclaims liability or warranty for this information). If you have questions about a medical condition or this instruction, always ask your healthcare professional. [place] [address] any warranty or liability for your use of this information. Musculoskeletal Chest Pain: Care Instructions  Your Care Instructions    Chest pain is not always a sign that something is wrong with your heart or that you have another serious problem. The doctor thinks your chest pain is caused by strained muscles or ligaments, inflamed chest cartilage, or another problem in your chest, rather than by your heart. You may need more tests to find the cause of your chest pain. Follow-up care is a key part of your treatment and safety. Be sure to make and go to all appointments, and call your doctor if you are having problems. It's also a good idea to know your test results and keep a list of the medicines you take. How can you care for yourself at home? · Take pain medicines exactly as directed. ¨ If the doctor gave you a prescription medicine for pain, take it as prescribed. ¨ If you are not taking a prescription pain medicine, ask your doctor if you can take an over-the-counter medicine. · Rest and protect the sore area.   · Stop, change, or take a break from any activity that may be causing your pain or soreness. · Put ice or a cold pack on the sore area for 10 to 20 minutes at a time. Try to do this every 1 to 2 hours for the next 3 days (when you are awake) or until the swelling goes down. Put a thin cloth between the ice and your skin. · After 2 or 3 days, apply a heating pad set on low or a warm cloth to the area that hurts. Some doctors suggest that you go back and forth between hot and cold. · Do not wrap or tape your ribs for support. This may cause you to take smaller breaths, which could increase your risk of lung problems. · Mentholated creams such as Bengay or Icy Hot may soothe sore muscles. Follow the instructions on the package. · Follow your doctor's instructions for exercising. · Gentle stretching and massage may help you get better faster. Stretch slowly to the point just before pain begins, and hold the stretch for at least 15 to 30 seconds. Do this 3 or 4 times a day. Stretch just after you have applied heat. · As your pain gets better, slowly return to your normal activities. Any increased pain may be a sign that you need to rest a while longer. When should you call for help? Call 911 anytime you think you may need emergency care. For example, call if:  ? · You have chest pain or pressure. This may occur with:  ¨ Sweating. ¨ Shortness of breath. ¨ Nausea or vomiting. ¨ Pain that spreads from the chest to the neck, jaw, or one or both shoulders or arms. ¨ Dizziness or lightheadedness. ¨ A fast or uneven pulse. After calling 911, chew 1 adult-strength aspirin. Wait for an ambulance. Do not try to drive yourself. ? · You have sudden chest pain and shortness of breath, or you cough up blood. ?Call your doctor now or seek immediate medical care if:  ? · You have any trouble breathing. ? · Your chest pain gets worse. ? · Your chest pain occurs consistently with exercise and is relieved by rest.   ? Watch closely for changes in your health, and be sure to contact your doctor if:  ? · Your chest pain does not get better after 1 week. Where can you learn more? Go to http://damaso-patrick.info/. Enter V293 in the search box to learn more about \"Musculoskeletal Chest Pain: Care Instructions. \"  Current as of: March 20, 2017  Content Version: 11.4  © 5066-7524 [employer]. Care instructions adapted under license by [employer] (which disclaims liability or warranty for this information). If you have questions about a medical condition or this instruction, always ask your healthcare professional. [place] [address] any warranty or liability for your use of this information.

## 2018-04-27 PROBLEM — R10.30 LOWER ABDOMINAL PAIN: Status: ACTIVE | Noted: 2018-04-27

## 2018-04-27 PROBLEM — F32.1 MODERATE MAJOR DEPRESSION (HCC): Chronic | Status: ACTIVE | Noted: 2018-04-27

## 2018-04-27 PROBLEM — Z12.11 SPECIAL SCREENING FOR MALIGNANT NEOPLASMS, COLON: Status: RESOLVED | Noted: 2018-04-27 | Resolved: 2018-04-27

## 2018-04-27 PROBLEM — F32.1 MODERATE MAJOR DEPRESSION (HCC): Status: ACTIVE | Noted: 2018-04-27

## 2018-04-27 PROBLEM — R14.0 BLOATING: Status: ACTIVE | Noted: 2018-04-27

## 2018-04-27 PROBLEM — Z87.19 HX OF GASTROESOPHAGEAL REFLUX (GERD): Chronic | Status: RESOLVED | Noted: 2018-04-27 | Resolved: 2018-04-27

## 2018-04-27 PROBLEM — Z80.0 FAMILY HISTORY OF MALIGNANT NEOPLASM OF GASTROINTESTINAL TRACT: Chronic | Status: ACTIVE | Noted: 2018-04-27

## 2018-04-27 PROBLEM — Z83.71 FAMILY HX COLONIC POLYPS: Chronic | Status: ACTIVE | Noted: 2018-04-27

## 2018-04-27 PROBLEM — Z83.71 FAMILY HISTORY OF COLONIC POLYPS: Status: ACTIVE | Noted: 2018-04-27

## 2018-04-27 PROBLEM — R19.7 DIARRHEA: Status: ACTIVE | Noted: 2018-04-27

## 2018-04-27 PROBLEM — R10.30 LOWER ABDOMINAL PAIN: Status: RESOLVED | Noted: 2018-04-27 | Resolved: 2018-04-27

## 2018-04-27 PROBLEM — R10.13 EPIGASTRIC PAIN: Status: RESOLVED | Noted: 2018-04-27 | Resolved: 2018-04-27

## 2018-04-27 PROBLEM — Z83.71 FAMILY HISTORY OF COLONIC POLYPS: Status: RESOLVED | Noted: 2018-04-27 | Resolved: 2018-04-27

## 2018-04-27 PROBLEM — R19.8 OTHER SYMPTOMS INVOLVING DIGESTIVE SYSTEM: Status: ACTIVE | Noted: 2018-04-27

## 2018-04-27 PROBLEM — Z83.71 FAMILY HX COLONIC POLYPS: Status: ACTIVE | Noted: 2018-04-27

## 2018-04-27 PROBLEM — R10.13 EPIGASTRIC PAIN: Status: ACTIVE | Noted: 2018-04-27

## 2018-04-27 PROBLEM — R14.0 BLOATING: Status: RESOLVED | Noted: 2018-04-27 | Resolved: 2018-04-27

## 2018-04-27 PROBLEM — Z87.19 HX OF GASTROESOPHAGEAL REFLUX (GERD): Status: ACTIVE | Noted: 2018-04-27

## 2018-04-27 PROBLEM — Z87.19 HX OF GASTROESOPHAGEAL REFLUX (GERD): Chronic | Status: ACTIVE | Noted: 2018-04-27

## 2018-04-27 PROBLEM — R19.7 DIARRHEA: Status: RESOLVED | Noted: 2018-04-27 | Resolved: 2018-04-27

## 2018-04-27 PROBLEM — Z12.11 SPECIAL SCREENING FOR MALIGNANT NEOPLASMS, COLON: Status: ACTIVE | Noted: 2018-04-27

## 2018-04-27 PROBLEM — Z80.0 FAMILY HISTORY OF MALIGNANT NEOPLASM OF GASTROINTESTINAL TRACT: Status: ACTIVE | Noted: 2018-04-27

## 2018-04-27 PROBLEM — R19.8 OTHER SYMPTOMS INVOLVING DIGESTIVE SYSTEM: Status: RESOLVED | Noted: 2018-04-27 | Resolved: 2018-04-27

## 2018-04-27 PROBLEM — Z98.890 HISTORY OF NISSEN FUNDOPLICATION: Status: ACTIVE | Noted: 2018-04-27

## 2018-04-27 PROBLEM — Z98.890 HISTORY OF NISSEN FUNDOPLICATION: Chronic | Status: ACTIVE | Noted: 2018-04-27

## 2018-07-06 ENCOUNTER — HOSPITAL ENCOUNTER (OUTPATIENT)
Dept: MAMMOGRAPHY | Age: 63
Discharge: HOME | End: 2018-07-06
Attending: OBSTETRICS & GYNECOLOGY
Payer: MEDICARE

## 2018-07-06 DIAGNOSIS — Z12.31 VISIT FOR SCREENING MAMMOGRAM: ICD-10-CM

## 2018-07-06 PROCEDURE — 77063 BREAST TOMOSYNTHESIS BI: CPT

## 2018-08-07 ENCOUNTER — HOSPITAL ENCOUNTER (OUTPATIENT)
Dept: GENERAL RADIOLOGY | Age: 63
Discharge: HOME | End: 2018-08-07
Attending: NURSE PRACTITIONER
Payer: MEDICARE

## 2018-08-07 ENCOUNTER — HOSPITAL ENCOUNTER (OUTPATIENT)
Dept: LAB | Age: 63
Discharge: HOME | End: 2018-08-07
Attending: NURSE PRACTITIONER
Payer: MEDICARE

## 2018-08-07 ENCOUNTER — HOSPITAL ENCOUNTER (OUTPATIENT)
Dept: ULTRASOUND IMAGING | Age: 63
Discharge: HOME | End: 2018-08-07
Attending: NURSE PRACTITIONER
Payer: MEDICARE

## 2018-08-07 DIAGNOSIS — M79.604 PAIN IN RIGHT LEG: ICD-10-CM

## 2018-08-07 DIAGNOSIS — R07.9 CHEST PAIN, UNSPECIFIED TYPE: ICD-10-CM

## 2018-08-07 LAB
ALBUMIN SERPL-MCNC: 3.2 G/DL (ref 3.2–4.6)
ALBUMIN/GLOB SERPL: 0.9 {RATIO} (ref 1.2–3.5)
ALP SERPL-CCNC: 101 U/L (ref 50–136)
ALT SERPL-CCNC: 17 U/L (ref 12–65)
ANION GAP SERPL CALC-SCNC: 8 MMOL/L (ref 7–16)
AST SERPL-CCNC: 19 U/L (ref 15–37)
BASOPHILS # BLD: 0.1 K/UL
BASOPHILS NFR BLD: 1 % (ref 0–2)
BILIRUB SERPL-MCNC: 0.2 MG/DL (ref 0.2–1.1)
BUN SERPL-MCNC: 5 MG/DL (ref 8–23)
CALCIUM SERPL-MCNC: 8.4 MG/DL (ref 8.3–10.4)
CHLORIDE SERPL-SCNC: 105 MMOL/L (ref 98–107)
CO2 SERPL-SCNC: 27 MMOL/L (ref 21–32)
CREAT SERPL-MCNC: 1.04 MG/DL (ref 0.6–1)
CRP SERPL-MCNC: <0.3 MG/DL (ref 0–0.9)
D DIMER PPP FEU-MCNC: 0.33 UG/ML(FEU)
DIFFERENTIAL METHOD BLD: NORMAL
EOSINOPHIL # BLD: 0.1 K/UL
EOSINOPHIL NFR BLD: 1 % (ref 0.5–7.8)
ERYTHROCYTE [DISTWIDTH] IN BLOOD BY AUTOMATED COUNT: 13.5 %
ERYTHROCYTE [SEDIMENTATION RATE] IN BLOOD: 24 MM/HR (ref 0–30)
GLOBULIN SER CALC-MCNC: 3.4 G/DL (ref 2.3–3.5)
GLUCOSE SERPL-MCNC: 162 MG/DL (ref 65–100)
HCT VFR BLD AUTO: 40.8 % (ref 35.8–46.3)
HGB BLD-MCNC: 13.2 G/DL (ref 11.7–15.4)
IMM GRANULOCYTES # BLD: 0 K/UL
IMM GRANULOCYTES NFR BLD AUTO: 0 % (ref 0–5)
LYMPHOCYTES # BLD: 3 K/UL
LYMPHOCYTES NFR BLD: 28 % (ref 13–44)
MCH RBC QN AUTO: 29.6 PG (ref 26.1–32.9)
MCHC RBC AUTO-ENTMCNC: 32.4 G/DL (ref 31.4–35)
MCV RBC AUTO: 91.5 FL (ref 79.6–97.8)
MONOCYTES # BLD: 0.6 K/UL
MONOCYTES NFR BLD: 5 % (ref 4–12)
NEUTS SEG # BLD: 6.9 K/UL
NEUTS SEG NFR BLD: 64 % (ref 43–78)
NRBC # BLD: 0 K/UL (ref 0–0.2)
PLATELET # BLD AUTO: 412 K/UL (ref 150–450)
PMV BLD AUTO: 10.4 FL (ref 9.4–12.3)
POTASSIUM SERPL-SCNC: 3.6 MMOL/L (ref 3.5–5.1)
PROT SERPL-MCNC: 6.6 G/DL (ref 6.3–8.2)
RBC # BLD AUTO: 4.46 M/UL (ref 4.05–5.2)
SODIUM SERPL-SCNC: 140 MMOL/L (ref 136–145)
URATE SERPL-MCNC: 5.9 MG/DL (ref 2.6–6)
WBC # BLD AUTO: 10.7 K/UL (ref 4.3–11.1)

## 2018-08-07 PROCEDURE — 73610 X-RAY EXAM OF ANKLE: CPT

## 2018-08-07 PROCEDURE — 85025 COMPLETE CBC W/AUTO DIFF WBC: CPT

## 2018-08-07 PROCEDURE — 85379 FIBRIN DEGRADATION QUANT: CPT

## 2018-08-07 PROCEDURE — 36415 COLL VENOUS BLD VENIPUNCTURE: CPT

## 2018-08-07 PROCEDURE — 84550 ASSAY OF BLOOD/URIC ACID: CPT

## 2018-08-07 PROCEDURE — 86140 C-REACTIVE PROTEIN: CPT

## 2018-08-07 PROCEDURE — 80053 COMPREHEN METABOLIC PANEL: CPT

## 2018-08-07 PROCEDURE — 93971 EXTREMITY STUDY: CPT

## 2018-08-07 PROCEDURE — 85652 RBC SED RATE AUTOMATED: CPT

## 2018-08-07 NOTE — PROGRESS NOTES
Notify patient RLE ultrasound impression: No evidence of deep venous thrombosis in the right lower extremity

## 2018-08-08 ENCOUNTER — HOSPITAL ENCOUNTER (EMERGENCY)
Age: 63
Discharge: HOME | End: 2018-08-08
Attending: EMERGENCY MEDICINE
Payer: MEDICARE

## 2018-08-08 ENCOUNTER — APPOINTMENT (OUTPATIENT)
Dept: GENERAL RADIOLOGY | Age: 63
End: 2018-08-08
Attending: EMERGENCY MEDICINE
Payer: MEDICARE

## 2018-08-08 ENCOUNTER — APPOINTMENT (OUTPATIENT)
Dept: ULTRASOUND IMAGING | Age: 63
End: 2018-08-08
Attending: EMERGENCY MEDICINE
Payer: MEDICARE

## 2018-08-08 VITALS
TEMPERATURE: 98.6 F | OXYGEN SATURATION: 98 % | HEIGHT: 67 IN | HEART RATE: 86 BPM | DIASTOLIC BLOOD PRESSURE: 71 MMHG | BODY MASS INDEX: 20.25 KG/M2 | WEIGHT: 129 LBS | SYSTOLIC BLOOD PRESSURE: 122 MMHG | RESPIRATION RATE: 12 BRPM

## 2018-08-08 DIAGNOSIS — M79.604 LEG PAIN, RIGHT: Primary | ICD-10-CM

## 2018-08-08 LAB
ANION GAP SERPL CALC-SCNC: 11 MMOL/L (ref 7–16)
BUN SERPL-MCNC: 5 MG/DL (ref 8–23)
CALCIUM SERPL-MCNC: 8.9 MG/DL (ref 8.3–10.4)
CHLORIDE SERPL-SCNC: 104 MMOL/L (ref 98–107)
CO2 SERPL-SCNC: 27 MMOL/L (ref 21–32)
CREAT SERPL-MCNC: 0.88 MG/DL (ref 0.6–1)
ERYTHROCYTE [DISTWIDTH] IN BLOOD BY AUTOMATED COUNT: 13.4 %
GLUCOSE SERPL-MCNC: 80 MG/DL (ref 65–100)
HCT VFR BLD AUTO: 36.5 % (ref 35.8–46.3)
HGB BLD-MCNC: 11.7 G/DL (ref 11.7–15.4)
LACTATE BLD-SCNC: 1 MMOL/L (ref 0.5–1.9)
MCH RBC QN AUTO: 29.2 PG (ref 26.1–32.9)
MCHC RBC AUTO-ENTMCNC: 32.1 G/DL (ref 31.4–35)
MCV RBC AUTO: 91 FL (ref 79.6–97.8)
NRBC # BLD: 0 K/UL (ref 0–0.2)
PLATELET # BLD AUTO: 305 K/UL (ref 150–450)
PMV BLD AUTO: 10.1 FL (ref 9.4–12.3)
POTASSIUM SERPL-SCNC: 3.9 MMOL/L (ref 3.5–5.1)
RBC # BLD AUTO: 4.01 M/UL (ref 4.05–5.2)
SODIUM SERPL-SCNC: 142 MMOL/L (ref 136–145)
URATE SERPL-MCNC: 5.1 MG/DL (ref 2.6–6)
WBC # BLD AUTO: 12.3 K/UL (ref 4.3–11.1)

## 2018-08-08 PROCEDURE — 80048 BASIC METABOLIC PNL TOTAL CA: CPT

## 2018-08-08 PROCEDURE — 99284 EMERGENCY DEPT VISIT MOD MDM: CPT | Performed by: EMERGENCY MEDICINE

## 2018-08-08 PROCEDURE — 84550 ASSAY OF BLOOD/URIC ACID: CPT

## 2018-08-08 PROCEDURE — 96376 TX/PRO/DX INJ SAME DRUG ADON: CPT | Performed by: EMERGENCY MEDICINE

## 2018-08-08 PROCEDURE — 85027 COMPLETE CBC AUTOMATED: CPT

## 2018-08-08 PROCEDURE — 96365 THER/PROPH/DIAG IV INF INIT: CPT | Performed by: EMERGENCY MEDICINE

## 2018-08-08 PROCEDURE — 73590 X-RAY EXAM OF LOWER LEG: CPT

## 2018-08-08 PROCEDURE — 74011250636 HC RX REV CODE- 250/636

## 2018-08-08 PROCEDURE — 74011250636 HC RX REV CODE- 250/636: Performed by: EMERGENCY MEDICINE

## 2018-08-08 PROCEDURE — 96375 TX/PRO/DX INJ NEW DRUG ADDON: CPT | Performed by: EMERGENCY MEDICINE

## 2018-08-08 PROCEDURE — 93926 LOWER EXTREMITY STUDY: CPT

## 2018-08-08 PROCEDURE — 83605 ASSAY OF LACTIC ACID: CPT

## 2018-08-08 RX ORDER — HYDROMORPHONE HYDROCHLORIDE 2 MG/ML
1 INJECTION, SOLUTION INTRAMUSCULAR; INTRAVENOUS; SUBCUTANEOUS ONCE
Status: COMPLETED | OUTPATIENT
Start: 2018-08-08 | End: 2018-08-08

## 2018-08-08 RX ORDER — DIPHENHYDRAMINE HYDROCHLORIDE 50 MG/ML
12.5 INJECTION, SOLUTION INTRAMUSCULAR; INTRAVENOUS
Status: COMPLETED | OUTPATIENT
Start: 2018-08-08 | End: 2018-08-08

## 2018-08-08 RX ORDER — DIPHENHYDRAMINE HYDROCHLORIDE 50 MG/ML
INJECTION, SOLUTION INTRAMUSCULAR; INTRAVENOUS
Status: COMPLETED
Start: 2018-08-08 | End: 2018-08-08

## 2018-08-08 RX ORDER — CEPHALEXIN 500 MG/1
500 CAPSULE ORAL 4 TIMES DAILY
Qty: 28 CAP | Refills: 0 | Status: SHIPPED | OUTPATIENT
Start: 2018-08-08 | End: 2018-08-15

## 2018-08-08 RX ORDER — MORPHINE SULFATE 2 MG/ML
4 INJECTION, SOLUTION INTRAMUSCULAR; INTRAVENOUS
Status: COMPLETED | OUTPATIENT
Start: 2018-08-08 | End: 2018-08-08

## 2018-08-08 RX ADMIN — MORPHINE SULFATE 4 MG: 2 INJECTION, SOLUTION INTRAMUSCULAR; INTRAVENOUS at 11:49

## 2018-08-08 RX ADMIN — HYDROMORPHONE HYDROCHLORIDE 1 MG: 2 INJECTION, SOLUTION INTRAMUSCULAR; INTRAVENOUS; SUBCUTANEOUS at 12:30

## 2018-08-08 RX ADMIN — VANCOMYCIN HYDROCHLORIDE 1000 MG: 1 INJECTION, POWDER, LYOPHILIZED, FOR SOLUTION INTRAVENOUS at 14:11

## 2018-08-08 RX ADMIN — HYDROMORPHONE HYDROCHLORIDE 1 MG: 2 INJECTION, SOLUTION INTRAMUSCULAR; INTRAVENOUS; SUBCUTANEOUS at 13:43

## 2018-08-08 RX ADMIN — DIPHENHYDRAMINE HYDROCHLORIDE 12.5 MG: 50 INJECTION, SOLUTION INTRAMUSCULAR; INTRAVENOUS at 14:40

## 2018-08-08 NOTE — ED PROVIDER NOTES
HPI:  Patient is still complaining of severe right leg pain. This started 3 days ago. After she had choked back from [place]. Stated she has 2 wounds one on her right bottom second toe and one on her right medial ankle from the beach. Should think it was from rubbing against the sand in her shoes. Started having pain severe in the right lateral ankle which now seems to radiate throughout the legs into the calf and muscle spasm in association. No fever. Went to see her NP. Blood work drawn. Also had ultrasound and x-ray of the ankle. Was informed that the results are normal.  No fractures or DVT. She came here today because the pain is severe and it is worsened. She has had prior stent in her left lower leg due to poor blood flow  No fall. No trauma. ROS  Constitutional: No fever, no chills  Skin: no rash  Eye:   ENMT:   Respiratory: No shortness of breath, no cough  Cardiovascular: No chest pain, no palpitations  Gastrointestinal: No vomiting, no nausea, no diarrhea, no abdominal pain  :   MSK: chronic back pain. Unchanged. No saddle paresthesia  Neuro: No headache, no change in mental status, no numbness, no tingling, no weakness  Psych:   Endocrine:   All other review of systems positive per history of present illness and the above otherwise negative or noncontributory.     Visit Vitals    /75 (BP 1 Location: Left arm, BP Patient Position: At rest)    Pulse 85    Temp 97.7 °F (36.5 °C)    Resp 18    Ht 5' 7\" (1.702 m)    Wt 58.5 kg (129 lb)    LMP 01/01/1984    SpO2 99%    BMI 20.2 kg/m2     Past Medical History:   Diagnosis Date    Anxiety     SEVERE    Arthritis     Depression 7/5/05    Fibrocystic breast     Gallbladder disorder     GERD (gastroesophageal reflux disease)     - no current meds - does not sleep elevated- states hx of NISSEN FUND    H/O vaginitis 11/3/06    H/O atrophic vaginitis    Headache(784.0)     Hematuria 4/9/01    Herpes 4/13/10    Hypercholesterolemia     hereditary    Mastalgia 3/20/01    H/O abn mammogram     PUD (peptic ulcer disease)     hx of PUD -     Raynaud's disease     raynauds    Smoker     1 pk/day x 42 years-- cut back to 2-3 cig daily    Urinary incontinence 04    Varicose vein     Vulvar pruritus 2014     Past Surgical History:   Procedure Laterality Date    CYSTOSCOPY,RESEC EJACULATORY DUCT      HX APPENDECTOMY  1971    HX BREAST RECONSTRUCTION Bilateral     BREAST LIFT/ NOT FOR CA    HX  SECTION  1993    Twins    HX CHOLECYSTECTOMY      HX COLONOSCOPY  -?  HX GI  2010    mel fundoplication    HX GYN  1982    D&C miscarriage    HX HEENT      fractured nose    HX HYSTERECTOMY  1984    HX KNEE ARTHROSCOPY Right 2005    knee scope    HX LUMBAR FUSION      HX MASTOPEXY (BREAST LIFT)          HX OVARIAN CYST REMOVAL  1980    Left    HX SALPINGO-OOPHORECTOMY  1976    right    HX THORACIC LAMINECTOMY      ??    VASCULAR SURGERY PROCEDURE UNLIST Left 16    arteriogram     Prior to Admission Medications   Prescriptions Last Dose Informant Patient Reported? Taking? amLODIPine (NORVASC) 2.5 mg tablet   No Yes   Sig: Take 1 Tab by mouth daily. augmented betamethasone dipropionate (DIPROLENE-AF) 0.05 % ointment   No Yes   Sig: Apply  to affected area two (2) times a day. cholecalciferol, vitamin D3, (VITAMIN D3) 2,000 unit tab   Yes Yes   Sig: Take 1 Tab by mouth daily. Hold for surgery - last dose 16   cyanocobalamin 1,000 mcg tablet   Yes Yes   Sig: Take 1,000 mcg by mouth daily. escitalopram oxalate (LEXAPRO) 10 mg tablet   No Yes   Sig: Take 1 Tab by mouth daily. Patient taking differently: Take 5 mg by mouth daily. fexofenadine (ALLEGRA) 180 mg tablet   Yes Yes   Sig: Take 180 mg by mouth daily.    nitroglycerin (NITROSTAT) 0.4 mg SL tablet Not Taking at Unknown time  No No   Si Tab by SubLINGual route every five (5) minutes as needed for Chest Pain. Indications: ANGINA   raNITIdine (ZANTAC) 150 mg tablet   No Yes   Sig: TAKE 1 TABLET BY MOUTH TWO (2) TIMES A DAY. simvastatin (ZOCOR) 40 mg tablet Not Taking at Unknown time  No No   Sig: TAKE 1 TABLET BY MOUTH NIGHTLY. spironolactone (ALDACTONE) 50 mg tablet   No Yes   Sig: Take 1 Tab by mouth daily. tretinoin (RETIN-A) 0.05 % topical cream   Yes Yes   Sig: Apply 1 Film to affected area nightly. Facility-Administered Medications: None         Adult Exam   General: alert, no acute distress  Head: normocephalic, atraumatic  ENT: moist mucous membranes  Neck: supple, non-tender; full range of motion   Cardiovascular: regular rate and rhythm, normal peripheral perfusion, no edema  Respiratory:  normal respirations; no wheezing, rales or rhonchi  Gastrointestinal: soft, non-tender; no rebound or guarding, no peritoneal signs, no distension  Back: non-tender, full range of motion  Musculoskeletal: normal range of motion, normal strength, no gross deformities  Dry lesions noted at the bottom of Rt 2nd toe and medial ankle. Mild erythema of the medial maleolus. Patient has pain even to very light touch throughout her right leg. Her calf does not appear edematous, erythematous compared to the left. Straight leg raise negative for any back pain and negative for any pain radiating down the posterior leg. Equal femoral/DP pulses bilat. Feet are warm bilat. Neurological: alert and oriented x 4, no gross focal deficits; normal speech  Psychiatric: cooperative; appropriate mood and affect    MDM: she had an ultrasound and actually yesterday at the ankle and DVT study. Both of which are negative. I obtained an x-ray of the tib-fib today negative for any acute fracture. She has chondrocalcinosis of the medial and the lateral meniscus. She has had prior vascular stenting for poor blood flow in her left lower leg.   I will obtain an arterial ultrasound of the right leg with SARAH for further assessment. She denies history of gout. It was a uric acid from yesterday of 5.9 which is upper limit of normal.  We'll repeat here today. Pain medication and reassess. ED Course   x-ray negative. Arterial ultrasound SARAH were normal.  Ultrasound yesterday negative. I spoke with her vascular surgeon Dr. Asha Boo on the phone. He reviewed the images. Does not feel this is vascular related. This may be an infection however I do not suspect any obvious necrotizing fasciitis. Her lactic acid is normal.  She does not have any Nikolsky's sign, no blistering of the skin. She has no crepitus. I do not suspect osteomyelitis at this time. Gave a dose of vancomycin because she was very concerned about infection. I see no signs of abscess. We'll discharge home with Keflex and Diflucan. She has pain medication at home. Recommend taking pain medication at home. Recommend follow-up with orthopedics or her primary care doctor. Xr Tib/fib Rt    Result Date: 8/8/2018  EXAM: 2 views of the right tibia and fibula. ADDITIONAL CLINICAL INFORMATION: Pain for 3 days COMPARISON: None. FINDINGS: Diffuse osteopenia. There is chondrocalcinosis of the medial and lateral meniscus. No evidence of acute fracture. No radiopaque foreign body. No knee joint effusion. No ankle joint effusion. No evidence of acute fracture. Mild midfoot degenerative change. IMPRESSION: 1. No evidence of acute fracture. 2. Chondrocalcinosis of the medial and lateral  meniscus. Xr Ankle Rt Min 3 V    Result Date: 8/7/2018  Right Ankle INDICATION: Right ankle pain and swelling Three views of the right ankle were obtained FINDINGS: There is no evidence of fracture or other acute bony abnormality. There are no bony lesions. The ankle mortise is intact.      IMPRESSION: Negative right ankle     Duplex Low Ext Artery Right W Sarah    Result Date: 8/8/2018  Peripheral Arterial Doppler Exam HISTORY: Smoker, hypertension, hyperlipidemia and previous surgery with right rest pain and ulcers. Bilateral lower extremity arterial duplex examination with ankle pressures were performed. In addition, digital waveform tracings of the great toes were obtained. COMPARISON: None. Brachial systolic blood pressure was 121 mmHg Right lower extremity: Triphasic arterial waveform tracings are demonstrated with normal velocities up to the popliteal artery. The right posterior tibial artery demonstrates diminished velocity and monophasic waveform. The anterior tibialis artery velocities are more normal and there is a biphasic waveform. . The right posterior tibialis and anterior tibialis artery pressures were 164 and 165 mmHg respectively yielding an ankle brachial index of 1.4. The right great toe pressure was 117 mmHg. Left lower extremity: Triphasic arterial waveform tracings are demonstrated with normal velocities. The left posterior tibialis and anterior tibialis artery pressures were 163 and 162 mmHg mmHg Respectively yielding an ankle brachial index of 1.4. The left great toe pressure was only 56 mmHg. Diminished amplitude waveforms at the left great toe demonstrated. IMPRESSION: Ankle-brachial indices are normal. There is diminished waveforms at the left great toe suggesting small vessel arterial disease in this location. Duplex Lower Ext Venous Right    Result Date: 8/7/2018  Right lower extremity venous ultrasound INDICATION:  Pain and swelling, Doppler ultrasound of the right lower extremity was performed. FINDINGS:  There is normal flow in the common femoral, superficial femoral, and popliteal veins. Normal compression and augmentation is demonstrated. The proximal calf veins are also patent.      IMPRESSION: No evidence of deep venous thrombosis in the right lower extremity     Recent Results (from the past 24 hour(s))   D DIMER    Collection Time: 08/07/18  4:19 PM   Result Value Ref Range    D DIMER 0.33 <0.56 ug/ml(FEU)   CBC WITH AUTOMATED DIFF Collection Time: 08/07/18  4:19 PM   Result Value Ref Range    WBC 10.7 4.3 - 11.1 K/uL    RBC 4.46 4.05 - 5.2 M/uL    HGB 13.2 11.7 - 15.4 g/dL    HCT 40.8 35.8 - 46.3 %    MCV 91.5 79.6 - 97.8 FL    MCH 29.6 26.1 - 32.9 PG    MCHC 32.4 31.4 - 35.0 g/dL    RDW 13.5 %    PLATELET 493 704 - 492 K/uL    MPV 10.4 9.4 - 12.3 FL    ABSOLUTE NRBC 0.00 0.0 - 0.2 K/uL    DF AUTOMATED      NEUTROPHILS 64 43 - 78 %    LYMPHOCYTES 28 13 - 44 %    MONOCYTES 5 4.0 - 12.0 %    EOSINOPHILS 1 0.5 - 7.8 %    BASOPHILS 1 0.0 - 2.0 %    IMMATURE GRANULOCYTES 0 0.0 - 5.0 %    ABS. NEUTROPHILS 6.9 K/UL    ABS. LYMPHOCYTES 3.0 K/UL    ABS. MONOCYTES 0.6 K/UL    ABS. EOSINOPHILS 0.1 K/UL    ABS. BASOPHILS 0.1 K/UL    ABS. IMM. GRANS. 0.0 K/UL   C REACTIVE PROTEIN, QT    Collection Time: 08/07/18  4:19 PM   Result Value Ref Range    C-Reactive protein <0.3 0.0 - 0.9 mg/dL   SED RATE, AUTOMATED    Collection Time: 08/07/18  4:19 PM   Result Value Ref Range    Sed rate, automated 24 0 - 30 mm/hr   METABOLIC PANEL, COMPREHENSIVE    Collection Time: 08/07/18  4:19 PM   Result Value Ref Range    Sodium 140 136 - 145 mmol/L    Potassium 3.6 3.5 - 5.1 mmol/L    Chloride 105 98 - 107 mmol/L    CO2 27 21 - 32 mmol/L    Anion gap 8 7 - 16 mmol/L    Glucose 162 (H) 65 - 100 mg/dL    BUN 5 (L) 8 - 23 MG/DL    Creatinine 1.04 (H) 0.6 - 1.0 MG/DL    GFR est AA >60 >60 ml/min/1.73m2    GFR est non-AA 57 (L) >60 ml/min/1.73m2    Calcium 8.4 8.3 - 10.4 MG/DL    Bilirubin, total 0.2 0.2 - 1.1 MG/DL    ALT (SGPT) 17 12 - 65 U/L    AST (SGOT) 19 15 - 37 U/L    Alk. phosphatase 101 50 - 136 U/L    Protein, total 6.6 6.3 - 8.2 g/dL    Albumin 3.2 3.2 - 4.6 g/dL    Globulin 3.4 2.3 - 3.5 g/dL    A-G Ratio 0.9 (L) 1.2 - 3.5     URIC ACID    Collection Time: 08/07/18  4:19 PM   Result Value Ref Range    Uric acid 5.9 2.6 - 6.0 MG/DL         Dragon voice recognition software was used to create this note.  Although the note has been reviewed and corrected where necessary, additional errors may have been overlooked and remain in the text.

## 2018-08-08 NOTE — DISCHARGE INSTRUCTIONS
Leg Pain: Care Instructions  Your Care Instructions  Many things can cause leg pain. Too much exercise or overuse can cause a muscle cramp (or charley horse). You can get leg cramps from not eating a balanced diet that has enough potassium, calcium, and other minerals. If you do not drink enough fluids or are taking certain medicines, you may develop leg cramps. Other causes of leg pain include injuries, blood flow problems, nerve damage, and twisted and enlarged veins (varicose veins). You can usually ease pain with self-care. Your doctor may recommend that you rest your leg and keep it elevated. Follow-up care is a key part of your treatment and safety. Be sure to make and go to all appointments, and call your doctor if you are having problems. It's also a good idea to know your test results and keep a list of the medicines you take. How can you care for yourself at home? · Take pain medicines exactly as directed. ¨ If the doctor gave you a prescription medicine for pain, take it as prescribed. ¨ If you are not taking a prescription pain medicine, ask your doctor if you can take an over-the-counter medicine. · Take any other medicines exactly as prescribed. Call your doctor if you think you are having a problem with your medicine. · Rest your leg while you have pain, and avoid standing for long periods of time. · Prop up your leg at or above the level of your heart when possible. · Make sure you are eating a balanced diet that is rich in calcium, potassium, and magnesium, especially if you are pregnant. · If directed by your doctor, put ice or a cold pack on the area for 10 to 20 minutes at a time. Put a thin cloth between the ice and your skin. · Your leg may be in a splint, a brace, or an elastic bandage, and you may have crutches to help you walk. Follow your doctor's directions about how long to wear supports and how to use the crutches. When should you call for help?   Call 911 anytime you think you may need emergency care. For example, call if:    · You have sudden chest pain and shortness of breath, or you cough up blood.     · Your leg is cool or pale or changes color.    Call your doctor now or seek immediate medical care if:    · You have increasing or severe pain.     · Your leg suddenly feels weak and you cannot move it.     · You have signs of a blood clot, such as:  ¨ Pain in your calf, back of the knee, thigh, or groin. ¨ Redness and swelling in your leg or groin.     · You have signs of infection, such as:  ¨ Increased pain, swelling, warmth, or redness. ¨ Red streaks leading from the sore area. ¨ Pus draining from a place on your leg. ¨ A fever.     · You cannot bear weight on your leg.    Watch closely for changes in your health, and be sure to contact your doctor if:    · You do not get better as expected. Where can you learn more? Go to http://damaso-patrick.info/. Enter W215 in the search box to learn more about \"Leg Pain: Care Instructions. \"  Current as of: November 20, 2017  Content Version: 11.7  © 9340-7388 [employer]. Care instructions adapted under license by [employer] (which disclaims liability or warranty for this information). If you have questions about a medical condition or this instruction, always ask your healthcare professional. [address] any warranty or liability for your use of this information.

## 2018-08-08 NOTE — ED TRIAGE NOTES
Patient arrives with her , from home, with complaint of foot and leg swelling and pain. Patient states that this has been happening since three days ago after her trip from [employer].

## 2018-08-08 NOTE — PROGRESS NOTES
Spoke with patient regarding lab results. She reports she is in more pain today than yesterday and her right lower leg is more red and swollen. She elects to go to ED for further evaluation due to the pain.

## 2018-08-08 NOTE — ED NOTES
I have reviewed discharge instructions with the patient. The patient verbalized understanding. Patient left ED via Discharge Method: wheelchair to Home with . Opportunity for questions and clarification provided. Patient given 1 scripts. To continue your aftercare when you leave the hospital, you may receive an automated call from our care team to check in on how you are doing. This is a free service and part of our promise to provide the best care and service to meet your aftercare needs.  If you have questions, or wish to unsubscribe from this service please call [phone]. Thank you for Choosing our [facility] Emergency Department.

## 2018-08-27 PROBLEM — R35.0 URINARY FREQUENCY: Status: ACTIVE | Noted: 2018-08-27

## 2018-08-27 PROBLEM — R39.15 URGENCY OF URINATION: Status: ACTIVE | Noted: 2018-08-27

## 2018-09-19 ENCOUNTER — HOSPITAL ENCOUNTER (OUTPATIENT)
Dept: MAMMOGRAPHY | Age: 63
Discharge: HOME | End: 2018-09-19
Attending: FAMILY MEDICINE
Payer: MEDICARE

## 2018-09-19 DIAGNOSIS — Z78.0 ASYMPTOMATIC MENOPAUSAL STATE: ICD-10-CM

## 2018-09-19 PROCEDURE — 77080 DXA BONE DENSITY AXIAL: CPT

## 2018-09-28 PROBLEM — R39.15 URGENCY OF URINATION: Status: RESOLVED | Noted: 2018-08-27 | Resolved: 2018-09-28

## 2018-09-28 PROBLEM — M81.0 AGE-RELATED OSTEOPOROSIS WITHOUT CURRENT PATHOLOGICAL FRACTURE: Chronic | Status: ACTIVE | Noted: 2018-09-28

## 2018-09-28 PROBLEM — R35.0 URINARY FREQUENCY: Status: RESOLVED | Noted: 2018-08-27 | Resolved: 2018-09-28

## 2018-10-15 ENCOUNTER — HOSPITAL ENCOUNTER (EMERGENCY)
Age: 63
Discharge: HOME | End: 2018-10-15
Attending: EMERGENCY MEDICINE
Payer: MEDICARE

## 2018-10-15 ENCOUNTER — APPOINTMENT (OUTPATIENT)
Dept: CT IMAGING | Age: 63
End: 2018-10-15
Attending: EMERGENCY MEDICINE
Payer: MEDICARE

## 2018-10-15 VITALS
HEART RATE: 88 BPM | TEMPERATURE: 98 F | BODY MASS INDEX: 20.4 KG/M2 | WEIGHT: 130 LBS | RESPIRATION RATE: 18 BRPM | OXYGEN SATURATION: 98 % | HEIGHT: 67 IN | SYSTOLIC BLOOD PRESSURE: 137 MMHG | DIASTOLIC BLOOD PRESSURE: 92 MMHG

## 2018-10-15 DIAGNOSIS — I16.0 HYPERTENSIVE URGENCY: Primary | ICD-10-CM

## 2018-10-15 DIAGNOSIS — R51.9 NONINTRACTABLE HEADACHE, UNSPECIFIED CHRONICITY PATTERN, UNSPECIFIED HEADACHE TYPE: ICD-10-CM

## 2018-10-15 DIAGNOSIS — M79.602 LEFT ARM PAIN: ICD-10-CM

## 2018-10-15 LAB
ALBUMIN SERPL-MCNC: 3.9 G/DL (ref 3.2–4.6)
ALBUMIN/GLOB SERPL: 1.1 {RATIO}
ALP SERPL-CCNC: 85 U/L (ref 50–130)
ALT SERPL-CCNC: 33 U/L (ref 12–65)
ANION GAP SERPL CALC-SCNC: 6 MMOL/L
AST SERPL-CCNC: 16 U/L (ref 15–37)
BASOPHILS # BLD: 0 K/UL (ref 0–0.2)
BASOPHILS NFR BLD: 0 % (ref 0–2)
BILIRUB SERPL-MCNC: 0.4 MG/DL (ref 0.2–1.1)
BUN SERPL-MCNC: 11 MG/DL (ref 8–23)
CALCIUM SERPL-MCNC: 9.1 MG/DL (ref 8.3–10.4)
CHLORIDE SERPL-SCNC: 103 MMOL/L (ref 98–107)
CO2 SERPL-SCNC: 28 MMOL/L (ref 21–32)
CREAT SERPL-MCNC: 1.05 MG/DL (ref 0.6–1)
DIFFERENTIAL METHOD BLD: NORMAL
EOSINOPHIL # BLD: 0.1 K/UL (ref 0–0.8)
EOSINOPHIL NFR BLD: 1 % (ref 0.5–7.8)
ERYTHROCYTE [DISTWIDTH] IN BLOOD BY AUTOMATED COUNT: 14.5 %
GLOBULIN SER CALC-MCNC: 3.6 G/DL (ref 2.3–3.5)
GLUCOSE SERPL-MCNC: 97 MG/DL (ref 65–100)
HCT VFR BLD AUTO: 40.8 % (ref 35.8–46.3)
HGB BLD-MCNC: 12.9 G/DL (ref 11.7–15.4)
IMM GRANULOCYTES # BLD: 0 K/UL (ref 0–0.5)
IMM GRANULOCYTES NFR BLD AUTO: 0 % (ref 0–5)
LYMPHOCYTES # BLD: 2.3 K/UL (ref 0.5–4.6)
LYMPHOCYTES NFR BLD: 23 % (ref 13–44)
MCH RBC QN AUTO: 28.8 PG (ref 26.1–32.9)
MCHC RBC AUTO-ENTMCNC: 31.6 G/DL (ref 31.4–35)
MCV RBC AUTO: 91.1 FL (ref 79.6–97.8)
MONOCYTES # BLD: 0.7 K/UL (ref 0.1–1.3)
MONOCYTES NFR BLD: 7 % (ref 4–12)
NEUTS SEG # BLD: 6.8 K/UL (ref 1.7–8.2)
NEUTS SEG NFR BLD: 68 % (ref 43–78)
NRBC # BLD: 0 K/UL (ref 0–0.2)
PLATELET # BLD AUTO: 341 K/UL (ref 150–450)
PMV BLD AUTO: 9.4 FL (ref 9.4–12.3)
POTASSIUM SERPL-SCNC: 4.2 MMOL/L (ref 3.5–5.1)
PROT SERPL-MCNC: 7.5 G/DL
RBC # BLD AUTO: 4.48 M/UL (ref 4.05–5.2)
SODIUM SERPL-SCNC: 137 MMOL/L (ref 136–145)
TROPONIN I BLD-MCNC: 0 NG/ML (ref 0.02–0.05)
WBC # BLD AUTO: 9.9 K/UL (ref 4.3–11.1)

## 2018-10-15 PROCEDURE — 70450 CT HEAD/BRAIN W/O DYE: CPT

## 2018-10-15 PROCEDURE — 81003 URINALYSIS AUTO W/O SCOPE: CPT | Performed by: EMERGENCY MEDICINE

## 2018-10-15 PROCEDURE — 84484 ASSAY OF TROPONIN QUANT: CPT

## 2018-10-15 PROCEDURE — 85025 COMPLETE CBC W/AUTO DIFF WBC: CPT

## 2018-10-15 PROCEDURE — 80053 COMPREHEN METABOLIC PANEL: CPT

## 2018-10-15 PROCEDURE — 99284 EMERGENCY DEPT VISIT MOD MDM: CPT | Performed by: EMERGENCY MEDICINE

## 2018-10-15 PROCEDURE — 93005 ELECTROCARDIOGRAM TRACING: CPT | Performed by: EMERGENCY MEDICINE

## 2018-10-15 RX ORDER — GABAPENTIN 300 MG/1
300 CAPSULE ORAL 3 TIMES DAILY
COMMUNITY
End: 2018-10-17

## 2018-10-15 NOTE — ED PROVIDER NOTES
HPI Comments: Patient presents to the emergency department complaining of moderate headache starting this afternoon with associated visual blurring. She notices that time her blood pressure was elevated and she took the Norvasc she is supposed to take every day. When the headache did not resolve over the next couple of hours, she came in for further evaluation. Patient does note that she had one episode of dizziness a couple of days ago that was very short-lived, and tonight with the episode of headache she also had some left arm discomfort. All symptoms resolved on my evaluation. Patient denies any chest pain. Patient is a 58 y.o. female presenting with headaches. The history is provided by the patient and the spouse. Headache This is a new problem. The current episode started 3 to 5 hours ago. Episode frequency: resolved. The problem has been resolved. Associated with: ppossibly secondary to elevated blood pressure/poor compliance with blood pressure medication. The pain is located in the bilateral and generalized region. The quality of the pain is described as throbbing. The patient is experiencing no pain. Associated symptoms include visual change (when the headache was bad, the patient did complain of some visual blurring. Now resolved. ). Pertinent negatives include no anorexia, no fever, no malaise/fatigue, no chest pressure, no near-syncope, no orthopnea, no palpitations, no syncope, no shortness of breath, no weakness, no tingling, no dizziness, no nausea and no vomiting. She has tried rest (Norvasc) for the symptoms. The treatment provided significant relief. Past Medical History:  
Diagnosis Date  Anxiety SEVERE  
 Arthritis  Depression 7/5/05  Fibrocystic breast   
 Gallbladder disorder  GERD (gastroesophageal reflux disease)   
 - no current meds - does not sleep elevated- states hx of NISSEN FUND  
 H/O vaginitis 11/3/06 H/O atrophic vaginitis  Headache(784.0)  Hematuria 4/9/01  Herpes 4/13/10  Hypercholesterolemia   
 hereditary  Mastalgia 3/20/01 H/O abn mammogram 1996  PUD (peptic ulcer disease) 2008  
 hx of PUD -   
 Raynaud's disease   
 raynauds  Smoker   
 1 pk/day x 42 years-- cut back to 2-3 cig daily  Urinary incontinence 9/1/04  Varicose vein  Vulvar pruritus 1/7/2014 Past Surgical History:  
Procedure Laterality Date [facility] EJACULATORY DUCT  2001 [facility]  HX BREAST RECONSTRUCTION Bilateral   
 BREAST LIFT/ NOT FOR CA  
 [address] [place]  HX CHOLECYSTECTOMY  2010  HX COLONOSCOPY  2008-9?  HX GI  2010  
 mel fundoplication [id number] [facility] D&C miscarriage  HX HEENT    
 fractured nose  HX HYSTERECTOMY  01/1984  HX KNEE ARTHROSCOPY Right 2005  
 knee scope  HX LUMBAR FUSION  2007  HX MASTOPEXY (BREAST LIFT) 2004 [address] Left  HX SALPINGO-OOPHORECTOMY  1976  
 right  HX THORACIC LAMINECTOMY  1987  
 ??  
 VASCULAR SURGERY PROCEDURE UNLIST Left 5/17/16  
 arteriogram  
 
   
Family History:  
Problem Relation Age of Onset  Heart Disease Mother  Cancer Mother   
  breast  
 Kidney Disease Mother  Dementia Mother Juancarlos Iqra Mother 54  Hypertension Father  Diabetes Father  Cancer Paternal Grandmother   
  breast, colon  Breast Cancer Paternal Grandmother  Stroke Paternal Grandfather  No Known Problems Maternal Grandmother  No Known Problems Maternal Grandfather Social History Social History  Marital status:  Spouse name: N/A  
 Number of children: N/A  
 Years of education: N/A Occupational History  Not on file. Social History Main Topics  Smoking status: Former Smoker Packs/day: 1.00 Years: 30.00 Types: Cigarettes  Smokeless tobacco: Never Used  Alcohol use 0.6 oz/week 1 Standard drinks or equivalent per week Comment: seldom  Drug use: No  
 Sexual activity: Yes  
  Partners: Male Birth control/ protection: Surgical  
 
Other Topics Concern  Not on file Social History Narrative ALLERGIES: Codeine and Compazine [prochlorperazine edisylate] Review of Systems Constitutional: Negative for chills, fever and malaise/fatigue. Respiratory: Negative for shortness of breath. Cardiovascular: Negative for chest pain, palpitations, orthopnea, leg swelling, syncope and near-syncope. Gastrointestinal: Negative for abdominal pain, anorexia, nausea and vomiting. Musculoskeletal: Negative for neck pain and neck stiffness. See HPI Neurological: Positive for headaches. Negative for dizziness, tingling and weakness. Psychiatric/Behavioral: Positive for decreased concentration (patient describes several episodes of poor memory over the last few weeks, often forgetting where she is supposed to go  or obligations she was supposed to keep.). All other systems reviewed and are negative. Vitals:  
 10/15/18 1644 10/15/18 1825 BP: 165/80 Pulse: 84 Resp: 16 Temp: 98 °F (36.7 °C) SpO2: 100% 100% Weight: 59 kg (130 lb) Height: 5' 7\" (1.702 m) Physical Exam  
Constitutional: She is oriented to person, place, and time. She appears well-developed and well-nourished. No distress. HENT:  
Head: Normocephalic and atraumatic. Right Ear: Tympanic membrane and external ear normal.  
Left Ear: Tympanic membrane and external ear normal.  
Mouth/Throat: Oropharynx is clear and moist.  
Eyes: Conjunctivae and EOM are normal. Pupils are equal, round, and reactive to light. Neck: Normal range of motion. Neck supple. No tracheal deviation present. Cardiovascular: Normal rate, regular rhythm, normal heart sounds and intact distal pulses. Exam reveals no gallop and no friction rub. No murmur heard. Pulmonary/Chest: Effort normal and breath sounds normal. No respiratory distress. She has no wheezes. Abdominal: Soft. Bowel sounds are normal. She exhibits no distension and no mass. There is no hepatosplenomegaly. There is no tenderness. There is no rebound and no guarding. Musculoskeletal: Normal range of motion. She exhibits no edema. Lymphadenopathy:  
  She has no cervical adenopathy. Neurological: She is alert and oriented to person, place, and time. She has normal strength. She displays normal reflexes. No cranial nerve deficit or sensory deficit. Coordination and gait normal.  
Negative pronator drift, normal finger-nose. Skin: Skin is warm and dry. No rash noted. She is not diaphoretic. No erythema. Psychiatric: She has a normal mood and affect. Her speech is normal and behavior is normal. Cognition and memory are normal.  
Nursing note and vitals reviewed. MDM Number of Diagnoses or Management Options Hypertensive urgency: new and requires workup Left arm pain: new and requires workup Nonintractable headache, unspecified chronicity pattern, unspecified headache type: new and requires workup Amount and/or Complexity of Data Reviewed Clinical lab tests: ordered and reviewed Tests in the radiology section of CPT®: ordered and reviewed Review and summarize past medical records: yes Independent visualization of images, tracings, or specimens: yes Risk of Complications, Morbidity, and/or Mortality Presenting problems: high Diagnostic procedures: moderate Management options: moderate Patient Progress Patient progress: improved ED Course Procedures Results Reviewed: 
 
 
Recent Results (from the past 24 hour(s)) EKG, 12 LEAD, INITIAL Collection Time: 10/15/18  5:25 PM  
Result Value Ref Range Ventricular Rate 83 BPM  
 Atrial Rate 83 BPM  
 P-R Interval 160 ms QRS Duration 76 ms  
 Q-T Interval 364 ms QTC Calculation (Bezet) 427 ms Calculated P Axis 53 degrees Calculated R Axis 38 degrees Calculated T Axis 45 degrees Diagnosis Normal sinus rhythm Normal ECG When compared with ECG of 08-NOV-2017 11:02, No significant change was found CBC WITH AUTOMATED DIFF Collection Time: 10/15/18  5:31 PM  
Result Value Ref Range WBC 9.9 4.3 - 11.1 K/uL  
 RBC 4.48 4.05 - 5.2 M/uL  
 HGB 12.9 11.7 - 15.4 g/dL HCT 40.8 35.8 - 46.3 % MCV 91.1 79.6 - 97.8 FL  
 MCH 28.8 26.1 - 32.9 PG  
 MCHC 31.6 31.4 - 35.0 g/dL  
 RDW 14.5 % PLATELET 395 888 - 250 K/uL MPV 9.4 9.4 - 12.3 FL ABSOLUTE NRBC 0.00 0.0 - 0.2 K/uL  
 DF AUTOMATED NEUTROPHILS 68 43 - 78 % LYMPHOCYTES 23 13 - 44 % MONOCYTES 7 4.0 - 12.0 % EOSINOPHILS 1 0.5 - 7.8 % BASOPHILS 0 0.0 - 2.0 % IMMATURE GRANULOCYTES 0 0.0 - 5.0 %  
 ABS. NEUTROPHILS 6.8 1.7 - 8.2 K/UL  
 ABS. LYMPHOCYTES 2.3 0.5 - 4.6 K/UL  
 ABS. MONOCYTES 0.7 0.1 - 1.3 K/UL  
 ABS. EOSINOPHILS 0.1 0.0 - 0.8 K/UL  
 ABS. BASOPHILS 0.0 0.0 - 0.2 K/UL  
 ABS. IMM. GRANS. 0.0 0.0 - 0.5 K/UL METABOLIC PANEL, COMPREHENSIVE Collection Time: 10/15/18  5:31 PM  
Result Value Ref Range Sodium 137 136 - 145 mmol/L Potassium 4.2 3.5 - 5.1 mmol/L Chloride 103 98 - 107 mmol/L  
 CO2 28 21 - 32 mmol/L Anion gap 6 mmol/L Glucose 97 65 - 100 mg/dL BUN 11 8 - 23 MG/DL Creatinine 1.05 (H) 0.6 - 1.0 MG/DL  
 GFR est AA >60 >60 ml/min/1.73m2 GFR est non-AA 56 ml/min/1.73m2 Calcium 9.1 8.3 - 10.4 MG/DL Bilirubin, total 0.4 0.2 - 1.1 MG/DL  
 ALT (SGPT) 33 12 - 65 U/L  
 AST (SGOT) 16 15 - 37 U/L Alk. phosphatase 85 50 - 130 U/L Protein, total 7.5 g/dL Albumin 3.9 3.2 - 4.6 g/dL Globulin 3.6 (H) 2.3 - 3.5 g/dL A-G Ratio 1.1 CT HEAD WO CONT Final Result IMPRESSION: No CT evidence of acute intracranial abnormality.   
  
  
 
 
I discussed the results of all labs, procedures, radiographs, and treatments with the patient and available family. Treatment plan is agreed upon and the patient is ready for discharge. All voiced understanding of the discharge plan and medication instructions or changes as appropriate. Questions about treatment in the ED were answered. All were encouraged to return should symptoms worsen or new problems develop.

## 2018-10-15 NOTE — DISCHARGE INSTRUCTIONS
Recommend follow-up with family doctor and/or possibly a neurologist for recent problems with short-term memory. Take your blood pressure medicine on the same time every day. Consider follow-up for outpatient stress test.     Headache: Care Instructions  Your Care Instructions    Headaches have many possible causes. Most headaches aren't a sign of a more serious problem, and they will get better on their own. Home treatment may help you feel better faster. The doctor has checked you carefully, but problems can develop later. If you notice any problems or new symptoms, get medical treatment right away. Follow-up care is a key part of your treatment and safety. Be sure to make and go to all appointments, and call your doctor if you are having problems. It's also a good idea to know your test results and keep a list of the medicines you take. How can you care for yourself at home? · Do not drive if you have taken a prescription pain medicine. · Rest in a quiet, dark room until your headache is gone. Close your eyes and try to relax or go to sleep. Don't watch TV or read. · Put a cold, moist cloth or cold pack on the painful area for 10 to 20 minutes at a time. Put a thin cloth between the cold pack and your skin. · Use a warm, moist towel or a heating pad set on low to relax tight shoulder and neck muscles. · Have someone gently massage your neck and shoulders. · Take pain medicines exactly as directed. ¨ If the doctor gave you a prescription medicine for pain, take it as prescribed. ¨ If you are not taking a prescription pain medicine, ask your doctor if you can take an over-the-counter medicine. · Be careful not to take pain medicine more often than the instructions allow, because you may get worse or more frequent headaches when the medicine wears off. · Do not ignore new symptoms that occur with a headache, such as a fever, weakness or numbness, vision changes, or confusion.  These may be signs of a more serious problem. To prevent headaches  · Keep a headache diary so you can figure out what triggers your headaches. Avoiding triggers may help you prevent headaches. Record when each headache began, how long it lasted, and what the pain was like (throbbing, aching, stabbing, or dull). Write down any other symptoms you had with the headache, such as nausea, flashing lights or dark spots, or sensitivity to bright light or loud noise. Note if the headache occurred near your period. List anything that might have triggered the headache, such as certain foods (chocolate, cheese, wine) or odors, smoke, bright light, stress, or lack of sleep. · Find healthy ways to deal with stress. Headaches are most common during or right after stressful times. Take time to relax before and after you do something that has caused a headache in the past.  · Try to keep your muscles relaxed by keeping good posture. Check your jaw, face, neck, and shoulder muscles for tension, and try relaxing them. When sitting at a desk, change positions often, and stretch for 30 seconds each hour. · Get plenty of sleep and exercise. · Eat regularly and well. Long periods without food can trigger a headache. · Treat yourself to a massage. Some people find that regular massages are very helpful in relieving tension. · Limit caffeine by not drinking too much coffee, tea, or soda. But don't quit caffeine suddenly, because that can also give you headaches. · Reduce eyestrain from computers by blinking frequently and looking away from the computer screen every so often. Make sure you have proper eyewear and that your monitor is set up properly, about an arm's length away. · Seek help if you have depression or anxiety. Your headaches may be linked to these conditions. Treatment can both prevent headaches and help with symptoms of anxiety or depression. When should you call for help? Call 911 anytime you think you may need emergency care.  For example, call if:    · You have signs of a stroke. These may include:  ¨ Sudden numbness, paralysis, or weakness in your face, arm, or leg, especially on only one side of your body. ¨ Sudden vision changes. ¨ Sudden trouble speaking. ¨ Sudden confusion or trouble understanding simple statements. ¨ Sudden problems with walking or balance. ¨ A sudden, severe headache that is different from past headaches.    Call your doctor now or seek immediate medical care if:    · You have a new or worse headache.     · Your headache gets much worse. Where can you learn more? Go to http://damasoEnerkempatrick.info/. Enter M271 in the search box to learn more about \"Headache: Care Instructions. \"  Current as of: June 4, 2018  Content Version: 11.8  © 4342-7808 [employer]. Care instructions adapted under license by [employer] (which disclaims liability or warranty for this information). If you have questions about a medical condition or this instruction, always ask your healthcare professional. [place] [address] any warranty or liability for your use of this information. Acute High Blood Pressure: Care Instructions  Your Care Instructions    Acute high blood pressure is very high blood pressure. It's a serious problem. Very high blood pressure can damage your brain, heart, eyes, and kidneys. You may have been given medicines to lower your blood pressure. You may have gotten them as pills or through a needle in one of your veins. This is called an IV. And maybe you were given other medicines too. These can be needed when high blood pressure causes other problems. To keep your blood pressure at a lower level, you may need to make healthy lifestyle changes. And you will probably need to take medicines. Be sure to follow up with your doctor about your blood pressure and what you can do about it. Follow-up care is a key part of your treatment and safety.  Be sure to make and go to all appointments, and call your doctor if you are having problems. It's also a good idea to know your test results and keep a list of the medicines you take. How can you care for yourself at home? · See your doctor as often as he or she recommends. This is to make sure your blood pressure is under control. You will probably go at least 2 times a year. But it may be more often at first.  · Take your blood pressure medicine exactly as prescribed. You may take one or more types. They include diuretics, beta-blockers, ACE inhibitors, calcium channel blockers, and angiotensin II receptor blockers. Call your doctor if you think you are having a problem with your medicine. · If you take blood pressure medicine, talk to your doctor before you take decongestants or anti-inflammatory medicine, such as ibuprofen. These can raise blood pressure. · Learn how to check your blood pressure at home. Check it often. · Ask your doctor if it's okay to drink alcohol. · Talk to your doctor about lifestyle changes that can help blood pressure. These include being active and not smoking. When should you call for help? Call 911 anytime you think you may need emergency care. This may mean having symptoms that suggest that your blood pressure is causing a serious heart or blood vessel problem. Your blood pressure may be over 180/120.   For example, call 911 if:    · You have symptoms of a heart attack. These may include:  ¨ Chest pain or pressure, or a strange feeling in the chest.  ¨ Sweating. ¨ Shortness of breath. ¨ Nausea or vomiting. ¨ Pain, pressure, or a strange feeling in the back, neck, jaw, or upper belly or in one or both shoulders or arms. ¨ Lightheadedness or sudden weakness. ¨ A fast or irregular heartbeat.     · You have symptoms of a stroke. These may include:  ¨ Sudden numbness, tingling, weakness, or loss of movement in your face, arm, or leg, especially on only one side of your body.   ¨ Sudden vision changes. ¨ Sudden trouble speaking. ¨ Sudden confusion or trouble understanding simple statements. ¨ Sudden problems with walking or balance. ¨ A sudden, severe headache that is different from past headaches.     · You have severe back or belly pain.    Do not wait until your blood pressure comes down on its own. Get help right away.   Call your doctor now or seek immediate care if:    · Your blood pressure is much higher than normal (such as 180/120 or higher), but you don't have symptoms.     · You think high blood pressure is causing symptoms, such as:  ¨ Severe headache. ¨ Blurry vision.    Watch closely for changes in your health, and be sure to contact your doctor if:    · Your blood pressure measures higher than your doctor recommends at least 2 times. That means the top number is higher or the bottom number is higher, or both.     · You think you may be having side effects from your blood pressure medicine. Where can you learn more? Go to http://damaso-patrick.info/. Enter X283 in the search box to learn more about \"Acute High Blood Pressure: Care Instructions. \"  Current as of: December 6, 2017  Content Version: 11.8  © 6949-7636 [employer]. Care instructions adapted under license by [employer] (which disclaims liability or warranty for this information). If you have questions about a medical condition or this instruction, always ask your healthcare professional. [place] [address] any warranty or liability for your use of this information.

## 2018-10-15 NOTE — ED NOTES
Pt has numerous complaints. Pt c/o elbow pain, headache, dizziness, blurred vision and intermittent chest pain over the past week. Pt has no chest pain at this time. Triage was interrupted by patient answering telephone and having a conversation with caller.

## 2018-10-16 LAB
ATRIAL RATE: 83 BPM
CALCULATED P AXIS, ECG09: 53 DEGREES
CALCULATED R AXIS, ECG10: 38 DEGREES
CALCULATED T AXIS, ECG11: 45 DEGREES
DIAGNOSIS, 93000: NORMAL
P-R INTERVAL, ECG05: 160 MS
Q-T INTERVAL, ECG07: 364 MS
QRS DURATION, ECG06: 76 MS
QTC CALCULATION (BEZET), ECG08: 427 MS
VENTRICULAR RATE, ECG03: 83 BPM

## 2018-10-16 NOTE — ED NOTES
I have reviewed discharge instructions with the patient. The patient verbalized understanding. Patient left ED via Discharge Method: ambulatory to Home with spouse Opportunity for questions and clarification provided. Patient given 0 scripts. To continue your aftercare when you leave the hospital, you may receive an automated call from our care team to check in on how you are doing. This is a free service and part of our promise to provide the best care and service to meet your aftercare needs.  If you have questions, or wish to unsubscribe from this service please call [phone]. Thank you for Choosing our [facility] Emergency Department.

## 2018-10-17 PROBLEM — Z63.0 MARITAL CONFLICT: Status: ACTIVE | Noted: 2018-10-17

## 2018-10-17 PROBLEM — I10 ESSENTIAL HYPERTENSION: Status: ACTIVE | Noted: 2018-10-17

## 2018-10-17 PROBLEM — R07.2 PRECORDIAL PAIN: Chronic | Status: ACTIVE | Noted: 2018-10-17

## 2018-10-26 ENCOUNTER — HOSPITAL ENCOUNTER (OUTPATIENT)
Dept: LAB | Age: 63
Discharge: HOME | End: 2018-10-26
Attending: INTERNAL MEDICINE
Payer: MEDICARE

## 2018-10-26 DIAGNOSIS — R00.0 TACHYCARDIA: ICD-10-CM

## 2018-10-26 LAB
T4 SERPL-MCNC: 9.7 UG/DL (ref 4.8–13.9)
TSH SERPL DL<=0.005 MIU/L-ACNC: 1.53 UIU/ML (ref 0.36–3.74)

## 2018-10-26 PROCEDURE — 84436 ASSAY OF TOTAL THYROXINE: CPT

## 2018-10-26 PROCEDURE — 84443 ASSAY THYROID STIM HORMONE: CPT

## 2018-10-26 PROCEDURE — 36415 COLL VENOUS BLD VENIPUNCTURE: CPT

## 2018-11-13 PROBLEM — L30.9 DERMATITIS: Status: ACTIVE | Noted: 2018-11-13

## 2018-11-13 PROBLEM — F41.9 ANXIETY: Chronic | Status: ACTIVE | Noted: 2018-11-13

## 2018-11-30 ENCOUNTER — HOSPITAL ENCOUNTER (OUTPATIENT)
Dept: CT IMAGING | Age: 63
Discharge: HOME | End: 2018-11-30
Attending: INTERNAL MEDICINE
Payer: MEDICARE

## 2018-11-30 VITALS — BODY MASS INDEX: 20.72 KG/M2 | WEIGHT: 132 LBS | HEIGHT: 67 IN

## 2018-11-30 DIAGNOSIS — R07.2 PRECORDIAL PAIN: ICD-10-CM

## 2018-11-30 PROCEDURE — 74011636320 HC RX REV CODE- 636/320: Performed by: INTERNAL MEDICINE

## 2018-11-30 PROCEDURE — 74011000258 HC RX REV CODE- 258: Performed by: INTERNAL MEDICINE

## 2018-11-30 PROCEDURE — 75574 CT ANGIO HRT W/3D IMAGE: CPT

## 2018-11-30 RX ORDER — SODIUM CHLORIDE 0.9 % (FLUSH) 0.9 %
10 SYRINGE (ML) INJECTION
Status: COMPLETED | OUTPATIENT
Start: 2018-11-30 | End: 2018-11-30

## 2018-11-30 RX ADMIN — SODIUM CHLORIDE 100 ML: 900 INJECTION, SOLUTION INTRAVENOUS at 08:15

## 2018-11-30 RX ADMIN — Medication 10 ML: at 08:14

## 2018-11-30 RX ADMIN — IOPAMIDOL 119 ML: 755 INJECTION, SOLUTION INTRAVENOUS at 08:14

## 2018-12-17 PROBLEM — I25.10 CORONARY ARTERY DISEASE INVOLVING NATIVE CORONARY ARTERY OF NATIVE HEART WITHOUT ANGINA PECTORIS: Chronic | Status: ACTIVE | Noted: 2018-12-17

## 2019-02-14 PROBLEM — R00.0 TACHYCARDIA: Status: RESOLVED | Noted: 2018-10-26 | Resolved: 2019-02-14

## 2019-02-14 PROBLEM — I10 ESSENTIAL HYPERTENSION: Chronic | Status: ACTIVE | Noted: 2018-10-17

## 2019-05-10 PROBLEM — M25.561 RIGHT KNEE PAIN: Status: ACTIVE | Noted: 2019-05-10

## 2019-08-23 PROBLEM — K63.5 BENIGN COLON POLYP: Status: ACTIVE | Noted: 2018-04-27

## 2019-08-23 PROBLEM — E03.9 ACQUIRED HYPOTHYROIDISM: Chronic | Status: RESOLVED | Noted: 2017-04-27 | Resolved: 2019-08-23

## 2019-08-23 PROBLEM — D69.2 SENILE PURPURA (HCC): Status: ACTIVE | Noted: 2019-08-23

## 2019-11-30 ENCOUNTER — APPOINTMENT (OUTPATIENT)
Dept: GENERAL RADIOLOGY | Age: 64
End: 2019-11-30
Attending: EMERGENCY MEDICINE
Payer: MEDICARE

## 2019-11-30 ENCOUNTER — APPOINTMENT (OUTPATIENT)
Dept: ULTRASOUND IMAGING | Age: 64
End: 2019-11-30
Attending: STUDENT IN AN ORGANIZED HEALTH CARE EDUCATION/TRAINING PROGRAM
Payer: MEDICARE

## 2019-11-30 ENCOUNTER — HOSPITAL ENCOUNTER (EMERGENCY)
Age: 64
Discharge: HOME | End: 2019-11-30
Attending: STUDENT IN AN ORGANIZED HEALTH CARE EDUCATION/TRAINING PROGRAM
Payer: MEDICARE

## 2019-11-30 VITALS
BODY MASS INDEX: 20.4 KG/M2 | HEART RATE: 81 BPM | HEIGHT: 67 IN | WEIGHT: 130 LBS | RESPIRATION RATE: 16 BRPM | DIASTOLIC BLOOD PRESSURE: 58 MMHG | OXYGEN SATURATION: 99 % | SYSTOLIC BLOOD PRESSURE: 122 MMHG | TEMPERATURE: 98.1 F

## 2019-11-30 DIAGNOSIS — R07.9 CHEST PAIN, UNSPECIFIED TYPE: Primary | ICD-10-CM

## 2019-11-30 DIAGNOSIS — R60.9 PERIPHERAL EDEMA: ICD-10-CM

## 2019-11-30 LAB
ALBUMIN SERPL-MCNC: 3 G/DL (ref 3.2–4.6)
ALBUMIN/GLOB SERPL: 1 {RATIO} (ref 1.2–3.5)
ALP SERPL-CCNC: 112 U/L (ref 50–130)
ALT SERPL-CCNC: 14 U/L (ref 12–65)
ANION GAP SERPL CALC-SCNC: 6 MMOL/L (ref 7–16)
AST SERPL-CCNC: 23 U/L (ref 15–37)
ATRIAL RATE: 80 BPM
BASOPHILS # BLD: 0.1 K/UL (ref 0–0.2)
BASOPHILS NFR BLD: 1 % (ref 0–2)
BILIRUB SERPL-MCNC: 0.4 MG/DL (ref 0.2–1.1)
BUN SERPL-MCNC: 8 MG/DL (ref 8–23)
CALCIUM SERPL-MCNC: 8.8 MG/DL (ref 8.3–10.4)
CALCULATED P AXIS, ECG09: 55 DEGREES
CALCULATED R AXIS, ECG10: 58 DEGREES
CALCULATED T AXIS, ECG11: 50 DEGREES
CHLORIDE SERPL-SCNC: 107 MMOL/L (ref 98–107)
CO2 SERPL-SCNC: 27 MMOL/L (ref 21–32)
CREAT SERPL-MCNC: 0.94 MG/DL (ref 0.6–1)
D DIMER PPP FEU-MCNC: 0.53 UG/ML(FEU)
DIAGNOSIS, 93000: NORMAL
DIFFERENTIAL METHOD BLD: ABNORMAL
EOSINOPHIL # BLD: 0.1 K/UL (ref 0–0.8)
EOSINOPHIL NFR BLD: 1 % (ref 0.5–7.8)
ERYTHROCYTE [DISTWIDTH] IN BLOOD BY AUTOMATED COUNT: 13.4 % (ref 11.9–14.6)
GLOBULIN SER CALC-MCNC: 3.1 G/DL (ref 2.3–3.5)
GLUCOSE SERPL-MCNC: 86 MG/DL (ref 65–100)
HCT VFR BLD AUTO: 38.7 % (ref 35.8–46.3)
HGB BLD-MCNC: 12.4 G/DL (ref 11.7–15.4)
IMM GRANULOCYTES # BLD AUTO: 0 K/UL (ref 0–0.5)
IMM GRANULOCYTES NFR BLD AUTO: 0 % (ref 0–5)
LYMPHOCYTES # BLD: 2.6 K/UL (ref 0.5–4.6)
LYMPHOCYTES NFR BLD: 20 % (ref 13–44)
MCH RBC QN AUTO: 29.7 PG (ref 26.1–32.9)
MCHC RBC AUTO-ENTMCNC: 32 G/DL (ref 31.4–35)
MCV RBC AUTO: 92.8 FL (ref 79.6–97.8)
MONOCYTES # BLD: 0.7 K/UL (ref 0.1–1.3)
MONOCYTES NFR BLD: 5 % (ref 4–12)
NEUTS SEG # BLD: 9.1 K/UL (ref 1.7–8.2)
NEUTS SEG NFR BLD: 72 % (ref 43–78)
NRBC # BLD: 0 K/UL (ref 0–0.2)
P-R INTERVAL, ECG05: 178 MS
PLATELET # BLD AUTO: 380 K/UL (ref 150–450)
PMV BLD AUTO: 9.6 FL (ref 9.4–12.3)
POTASSIUM SERPL-SCNC: 4.4 MMOL/L (ref 3.5–5.1)
PROT SERPL-MCNC: 6.1 G/DL (ref 6.3–8.2)
Q-T INTERVAL, ECG07: 366 MS
QRS DURATION, ECG06: 76 MS
QTC CALCULATION (BEZET), ECG08: 422 MS
RBC # BLD AUTO: 4.17 M/UL (ref 4.05–5.2)
SODIUM SERPL-SCNC: 140 MMOL/L (ref 136–145)
TROPONIN I BLD-MCNC: 0 NG/ML (ref 0.02–0.05)
TROPONIN I SERPL-MCNC: <0.02 NG/ML (ref 0.02–0.05)
VENTRICULAR RATE, ECG03: 80 BPM
WBC # BLD AUTO: 12.6 K/UL (ref 4.3–11.1)

## 2019-11-30 PROCEDURE — 74011250637 HC RX REV CODE- 250/637: Performed by: STUDENT IN AN ORGANIZED HEALTH CARE EDUCATION/TRAINING PROGRAM

## 2019-11-30 PROCEDURE — 84484 ASSAY OF TROPONIN QUANT: CPT

## 2019-11-30 PROCEDURE — 93005 ELECTROCARDIOGRAM TRACING: CPT | Performed by: STUDENT IN AN ORGANIZED HEALTH CARE EDUCATION/TRAINING PROGRAM

## 2019-11-30 PROCEDURE — 99285 EMERGENCY DEPT VISIT HI MDM: CPT | Performed by: STUDENT IN AN ORGANIZED HEALTH CARE EDUCATION/TRAINING PROGRAM

## 2019-11-30 PROCEDURE — 93971 EXTREMITY STUDY: CPT

## 2019-11-30 PROCEDURE — 85379 FIBRIN DEGRADATION QUANT: CPT

## 2019-11-30 PROCEDURE — 71046 X-RAY EXAM CHEST 2 VIEWS: CPT

## 2019-11-30 PROCEDURE — 93005 ELECTROCARDIOGRAM TRACING: CPT | Performed by: EMERGENCY MEDICINE

## 2019-11-30 PROCEDURE — 85025 COMPLETE CBC W/AUTO DIFF WBC: CPT

## 2019-11-30 PROCEDURE — 80053 COMPREHEN METABOLIC PANEL: CPT

## 2019-11-30 RX ORDER — GUAIFENESIN 100 MG/5ML
324 LIQUID (ML) ORAL
Status: COMPLETED | OUTPATIENT
Start: 2019-11-30 | End: 2019-11-30

## 2019-11-30 RX ORDER — SUCRALFATE 1 G/1
1 TABLET ORAL 4 TIMES DAILY
Qty: 60 TAB | Refills: 2 | Status: SHIPPED | OUTPATIENT
Start: 2019-11-30 | End: 2019-12-15

## 2019-11-30 RX ORDER — NITROGLYCERIN 0.4 MG/1
0.4 TABLET SUBLINGUAL
Status: DISCONTINUED | OUTPATIENT
Start: 2019-11-30 | End: 2019-11-30 | Stop reason: HOSPADM

## 2019-11-30 RX ADMIN — NITROGLYCERIN 0.4 MG: 0.4 TABLET SUBLINGUAL at 13:20

## 2019-11-30 RX ADMIN — ASPIRIN 81 MG 324 MG: 81 TABLET ORAL at 12:00

## 2019-11-30 NOTE — ED PROVIDER NOTES
62-year-old female patient presents with reports of substernal chest discomfort. She describes this pain as a squeezing sensation in her chest.  It occurred first on Wednesday of this week. Pain lasted several minutes prior to spontaneous resolution. Patient states pain returned yesterday was much more severe. This pain lasted hours prior to slowly resolving. She denies any nausea, vomiting, diaphoresis. No significant shortness of breath. Patient states she woke this morning with similar pain that she experienced yesterday there is been constant nature since onset. It is much less severe today but concerned her enough to present to the emergency department. Patient reports some swelling in the right lower extremity as well. She raises concern for potential blood clots as a cause of both symptoms. She has a history of iliac stents and has failed to follow-up for repeat Doppler imaging to assure the stents are functioning normally. She does report discomfort in her chest at this time. It is nonradiating and constant nature. Denies obvious alleviating or exacerbating factors. Patient attempted Zantac at home to no avail.            Past Medical History:   Diagnosis Date    Anxiety     SEVERE    Arthritis     Atheroembolism of left lower extremity ([address] 4/26/2016    Depression 7/5/05    Fibrocystic breast     Gallbladder disorder     GERD (gastroesophageal reflux disease)     - no current meds - does not sleep elevated- states hx of NISSEN FUND    H/O vaginitis 11/3/06    H/O atrophic vaginitis    Headache(784.0)     Hematuria 4/9/01    Herpes 4/13/10    Hypercholesterolemia     hereditary    Mastalgia 3/20/01    H/O abn mammogram 1996    PUD (peptic ulcer disease) 2008    hx of PUD -     Raynaud's disease     raynauds    Smoker     1 pk/day x 42 years-- cut back to 2-3 cig daily    Urinary incontinence 9/1/04    Varicose vein     Vulvar pruritus 1/7/2014       Past Surgical History: Procedure Laterality Date    CYSTOSCOPY,RESEC EJACULATORY DUCT      HX APPENDECTOMY  1971    HX BREAST RECONSTRUCTION Bilateral     BREAST LIFT/ NOT FOR CA    HX  SECTION  1993    Twins    HX CHOLECYSTECTOMY  2010    HX COLONOSCOPY  -?  HX GI  2010    mel fundoplication    HX GYN  1982    D&C miscarriage    HX HEENT      fractured nose    HX HYSTERECTOMY  1984    HX KNEE ARTHROSCOPY Right 2005    knee scope    HX LUMBAR FUSION  2007    HX MASTOPEXY (BREAST LIFT)      2004    HX OVARIAN CYST REMOVAL  1980    Left    HX SALPINGO-OOPHORECTOMY      right    HX THORACIC LAMINECTOMY      ??    VASCULAR SURGERY PROCEDURE UNLIST Left 16    arteriogram         Family History:   Problem Relation Age of Onset    Heart Disease Mother     Cancer Mother         breast    Kidney Disease Mother     Dementia Mother     Breast Cancer Mother 54    Hypertension Father     Diabetes Father     Cancer Paternal Grandmother         breast, colon    Breast Cancer Paternal Grandmother     Stroke Paternal Grandfather     No Known Problems Maternal Grandmother     No Known Problems Maternal Grandfather        Social History     Socioeconomic History    Marital status:      Spouse name: Not on file    Number of children: Not on file    Years of education: Not on file    Highest education level: Not on file   Occupational History    Not on file   Social Needs    Financial resource strain: Not on file    Food insecurity:     Worry: Not on file     Inability: Not on file    Transportation needs:     Medical: Not on file     Non-medical: Not on file   Tobacco Use    Smoking status: Current Every Day Smoker     Packs/day: 1.00     Years: 30.00     Pack years: 30.00     Types: Cigarettes    Smokeless tobacco: Never Used    Tobacco comment: Trying to quit    Substance and Sexual Activity    Alcohol use:  Yes     Alcohol/week: 1.0 standard drinks     Types: 1 Standard drinks or equivalent per week     Frequency: Monthly or less     Drinks per session: 1 or 2     Binge frequency: Never     Comment: seldom    Drug use: No    Sexual activity: Yes     Partners: Male     Birth control/protection: Surgical   Lifestyle    Physical activity:     Days per week: Not on file     Minutes per session: Not on file    Stress: Not on file   Relationships    Social connections:     Talks on phone: Not on file     Gets together: Not on file     Attends Spiritism service: Not on file     Active member of club or organization: Not on file     Attends meetings of clubs or organizations: Not on file     Relationship status: Not on file    Intimate partner violence:     Fear of current or ex partner: Not on file     Emotionally abused: Not on file     Physically abused: Not on file     Forced sexual activity: Not on file   Other Topics Concern    Not on file   Social History Narrative    Not on file         ALLERGIES: Codeine and Compazine [prochlorperazine edisylate]    Review of Systems   Constitutional: Negative for chills, diaphoresis and fever. HENT: Negative for congestion, sneezing and sore throat. Eyes: Negative for visual disturbance. Respiratory: Negative for cough, chest tightness, shortness of breath and wheezing. Cardiovascular: Positive for chest pain and leg swelling. Gastrointestinal: Negative for abdominal pain, blood in stool, diarrhea, nausea and vomiting. Endocrine: Negative for polyuria. Genitourinary: Negative for difficulty urinating, dysuria, flank pain, hematuria and urgency. Musculoskeletal: Negative for back pain, myalgias, neck pain and neck stiffness. Skin: Negative for color change and rash. Neurological: Negative for dizziness, syncope, speech difficulty, weakness, light-headedness, numbness and headaches. Psychiatric/Behavioral: Negative for behavioral problems. All other systems reviewed and are negative.       Vitals:    11/30/19 1049 BP: 127/72   Pulse: 74   Resp: 16   Temp: 98.1 °F (36.7 °C)   SpO2: 100%   Weight: 59 kg (130 lb)   Height: 5' 7\" (1.702 m)            Physical Exam  Vitals signs and nursing note reviewed. Constitutional:       General: She is not in acute distress. Appearance: She is well-developed. She is not diaphoretic. Comments: Alert and oriented to person place and time. No acute distress, speaks in clear, fluid sentences. HENT:      Head: Normocephalic and atraumatic. Right Ear: External ear normal.      Left Ear: External ear normal.      Nose: Nose normal.   Eyes:      Pupils: Pupils are equal, round, and reactive to light. Neck:      Musculoskeletal: Normal range of motion. Cardiovascular:      Rate and Rhythm: Normal rate and regular rhythm. Heart sounds: Normal heart sounds. No murmur. No friction rub. No gallop. Pulmonary:      Effort: Pulmonary effort is normal. No respiratory distress. Breath sounds: Normal breath sounds. No stridor. No decreased breath sounds, wheezing, rhonchi or rales. Chest:      Chest wall: No tenderness. Abdominal:      General: There is no distension. Palpations: Abdomen is soft. There is no mass. Tenderness: There is no tenderness. There is no guarding or rebound. Hernia: No hernia is present. Musculoskeletal: Normal range of motion. General: No tenderness or deformity. Comments: Swelling is noted in the right lower extremity. Trace pitting edema noted. No significant pain or redness. No significant swelling noted over the left lower extremity. Skin:     General: Skin is warm and dry. Neurological:      Mental Status: She is alert and oriented to person, place, and time. Cranial Nerves: No cranial nerve deficit. MDM  Number of Diagnoses or Management Options  Diagnosis management comments: EKG obtained on arrival shows normal sinus rhythm with a rate of 80 beats a minute.   No evidence of acute ischemic change. Reports ongoing pain at this time. We will treat with aspirin and nitro. Orders placed for Doppler imaging of the right lower extremity. I will go ahead and obtain a d-dimer as patient reports some discomfort in her chest and voices concern for DVT versus PE.        Amount and/or Complexity of Data Reviewed  Clinical lab tests: ordered and reviewed  Tests in the radiology section of CPT®: ordered and reviewed  Tests in the medicine section of CPT®: ordered and reviewed  Independent visualization of images, tracings, or specimens: yes    Risk of Complications, Morbidity, and/or Mortality  Presenting problems: moderate  Diagnostic procedures: moderate  Management options: moderate    Patient Progress  Patient progress: stable         Procedures

## 2019-11-30 NOTE — DISCHARGE INSTRUCTIONS
Patient Education      Although your evaluation today has been normal, you require close outpatient follow-up with a cardiology specialist.  This appointment has been arranged. You should expect to be contacted from this department within the next 48 hours to schedule your appointment. If you do not receive this call, call the number listed. In an attempt to prevent further bouts of chest discomfort, we have prescribed a medication to help with reflux and gastritis. Take the medication as directed until follow-up. Return for worsening symptoms, concerns or questions. Please arrange follow-up with Dr. Lee Ann Bowen of vascular surgery for evaluation of your iliac stents and lower extremity swelling. Chest Pain: Care Instructions  Your Care Instructions    There are many things that can cause chest pain. Some are not serious and will get better on their own in a few days. But some kinds of chest pain need more testing and treatment. Your doctor may have recommended a follow-up visit in the next 8 to 12 hours. If you are not getting better, you may need more tests or treatment. Even though your doctor has released you, you still need to watch for any problems. The doctor carefully checked you, but sometimes problems can develop later. If you have new symptoms or if your symptoms do not get better, get medical care right away. If you have worse or different chest pain or pressure that lasts more than 5 minutes or you passed out (lost consciousness), call 911 or seek other emergency help right away. A medical visit is only one step in your treatment. Even if you feel better, you still need to do what your doctor recommends, such as going to all suggested follow-up appointments and taking medicines exactly as directed. This will help you recover and help prevent future problems. How can you care for yourself at home? · Rest until you feel better. · Take your medicine exactly as prescribed.  Call your doctor if you think you are having a problem with your medicine. · Do not drive after taking a prescription pain medicine. When should you call for help? Call 911 if:    · You passed out (lost consciousness).     · You have severe difficulty breathing.     · You have symptoms of a heart attack. These may include:  ? Chest pain or pressure, or a strange feeling in your chest.  ? Sweating. ? Shortness of breath. ? Nausea or vomiting. ? Pain, pressure, or a strange feeling in your back, neck, jaw, or upper belly or in one or both shoulders or arms. ? Lightheadedness or sudden weakness. ? A fast or irregular heartbeat. After you call 911, the  may tell you to chew 1 adult-strength or 2 to 4 low-dose aspirin. Wait for an ambulance. Do not try to drive yourself.    Call your doctor today if:    · You have any trouble breathing.     · Your chest pain gets worse.     · You are dizzy or lightheaded, or you feel like you may faint.     · You are not getting better as expected.     · You are having new or different chest pain. Where can you learn more? Go to http://damaso-patrick.info/. Enter A120 in the search box to learn more about \"Chest Pain: Care Instructions. \"  Current as of: June 26, 2019  Content Version: 12.2  © 8395-6901 [employer]. Care instructions adapted under license by [employer] (which disclaims liability or warranty for this information). If you have questions about a medical condition or this instruction, always ask your healthcare professional. [place] [address] any warranty or liability for your use of this information.

## 2019-11-30 NOTE — ED TRIAGE NOTES
Patient states 1 week ago she had an episode of squeezing mid-sternal chest pan that radiated into her back and resolved after taking zantac, then she states she had another episode on thursday, and again yesterday that were longer in duration and state pain has not resolved since yesterday. Patient also complains of pain and swelling to right lower leg that is new since yesterday. Patient states history of iliac stents in left leg but none in right. Patient denies any shortness of breath.

## 2019-11-30 NOTE — ROUTINE PROCESS
I have reviewed discharge instructions with the patient. The patient verbalized understanding. Patient left ED via Discharge Method: ambulatory to Home with spouse. Opportunity for questions and clarification provided. Patient given 1 scripts. To continue your aftercare when you leave the hospital, you may receive an automated call from our care team to check in on how you are doing. This is a free service and part of our promise to provide the best care and service to meet your aftercare needs.  If you have questions, or wish to unsubscribe from this service please call [phone]. Thank you for Choosing our [facility] Emergency Department.

## 2019-12-01 LAB
ATRIAL RATE: 69 BPM
CALCULATED P AXIS, ECG09: 54 DEGREES
CALCULATED R AXIS, ECG10: 55 DEGREES
CALCULATED T AXIS, ECG11: 41 DEGREES
DIAGNOSIS, 93000: NORMAL
P-R INTERVAL, ECG05: 186 MS
Q-T INTERVAL, ECG07: 380 MS
QRS DURATION, ECG06: 80 MS
QTC CALCULATION (BEZET), ECG08: 407 MS
VENTRICULAR RATE, ECG03: 69 BPM

## 2019-12-06 RX ORDER — RANITIDINE 150 MG/1
150 TABLET, FILM COATED ORAL
COMMUNITY
End: 2020-09-28

## 2019-12-06 RX ORDER — CYANOCOBALAMIN 1000 UG/ML
1000 INJECTION, SOLUTION INTRAMUSCULAR; SUBCUTANEOUS
COMMUNITY

## 2019-12-06 NOTE — PROGRESS NOTES
Patient pre-assessment complete for [facility] poss with Dr Benjamín Pino scheduled for 19 at 11am, arrival time 9am. Patient verified using . Patient instructed to bring all home medications in labeled bottles on the day of procedure. NPO status reinforced. Patient informed to take a full dose aspirin 325mg  or 81 mg x 4 on the day of procedure. Instructed they can take all other medications excluding vitamins & supplements. Patient verbalizes understanding of all instructions & denies any questions at this time.

## 2019-12-09 ENCOUNTER — HOSPITAL ENCOUNTER (OUTPATIENT)
Dept: CARDIAC CATH/INVASIVE PROCEDURES | Age: 64
Discharge: HOME | End: 2019-12-09
Attending: INTERNAL MEDICINE | Admitting: INTERNAL MEDICINE
Payer: MEDICARE

## 2019-12-09 VITALS
RESPIRATION RATE: 17 BRPM | HEIGHT: 66 IN | DIASTOLIC BLOOD PRESSURE: 62 MMHG | HEART RATE: 73 BPM | OXYGEN SATURATION: 96 % | TEMPERATURE: 98.2 F | WEIGHT: 135 LBS | BODY MASS INDEX: 21.69 KG/M2 | SYSTOLIC BLOOD PRESSURE: 114 MMHG

## 2019-12-09 LAB
ANION GAP SERPL CALC-SCNC: 5 MMOL/L (ref 7–16)
BUN SERPL-MCNC: 6 MG/DL (ref 8–23)
CALCIUM SERPL-MCNC: 8.7 MG/DL (ref 8.3–10.4)
CHLORIDE SERPL-SCNC: 108 MMOL/L (ref 98–107)
CO2 SERPL-SCNC: 26 MMOL/L (ref 21–32)
CREAT SERPL-MCNC: 0.98 MG/DL (ref 0.6–1)
ERYTHROCYTE [DISTWIDTH] IN BLOOD BY AUTOMATED COUNT: 13.4 % (ref 11.9–14.6)
GLUCOSE SERPL-MCNC: 81 MG/DL (ref 65–100)
HCT VFR BLD AUTO: 37.9 % (ref 35.8–46.3)
HGB BLD-MCNC: 12.2 G/DL (ref 11.7–15.4)
INR PPP: 0.9
MCH RBC QN AUTO: 29.8 PG (ref 26.1–32.9)
MCHC RBC AUTO-ENTMCNC: 32.2 G/DL (ref 31.4–35)
MCV RBC AUTO: 92.7 FL (ref 79.6–97.8)
NRBC # BLD: 0 K/UL (ref 0–0.2)
PLATELET # BLD AUTO: 372 K/UL (ref 150–450)
PMV BLD AUTO: 9.5 FL (ref 9.4–12.3)
POTASSIUM SERPL-SCNC: 3.7 MMOL/L (ref 3.5–5.1)
PROTHROMBIN TIME: 12.9 SEC (ref 11.7–14.5)
RBC # BLD AUTO: 4.09 M/UL (ref 4.05–5.2)
SODIUM SERPL-SCNC: 139 MMOL/L (ref 136–145)
WBC # BLD AUTO: 12.4 K/UL (ref 4.3–11.1)

## 2019-12-09 PROCEDURE — 77030019569 HC BND COMPR RAD TERU -B

## 2019-12-09 PROCEDURE — 99152 MOD SED SAME PHYS/QHP 5/>YRS: CPT

## 2019-12-09 PROCEDURE — C1769 GUIDE WIRE: HCPCS

## 2019-12-09 PROCEDURE — 85027 COMPLETE CBC AUTOMATED: CPT

## 2019-12-09 PROCEDURE — 85610 PROTHROMBIN TIME: CPT

## 2019-12-09 PROCEDURE — 74011250636 HC RX REV CODE- 250/636: Performed by: INTERNAL MEDICINE

## 2019-12-09 PROCEDURE — 74011000250 HC RX REV CODE- 250: Performed by: INTERNAL MEDICINE

## 2019-12-09 PROCEDURE — C1894 INTRO/SHEATH, NON-LASER: HCPCS

## 2019-12-09 PROCEDURE — 77030015766

## 2019-12-09 PROCEDURE — 74011636320 HC RX REV CODE- 636/320: Performed by: INTERNAL MEDICINE

## 2019-12-09 PROCEDURE — 80048 BASIC METABOLIC PNL TOTAL CA: CPT

## 2019-12-09 PROCEDURE — 93458 L HRT ARTERY/VENTRICLE ANGIO: CPT

## 2019-12-09 PROCEDURE — 77030004534 HC CATH ANGI DX INFN CARD -A

## 2019-12-09 PROCEDURE — 74011250637 HC RX REV CODE- 250/637: Performed by: INTERNAL MEDICINE

## 2019-12-09 RX ORDER — GUAIFENESIN 100 MG/5ML
81 LIQUID (ML) ORAL ONCE
Status: COMPLETED | OUTPATIENT
Start: 2019-12-09 | End: 2019-12-09

## 2019-12-09 RX ORDER — SODIUM CHLORIDE 9 MG/ML
75 INJECTION, SOLUTION INTRAVENOUS CONTINUOUS
Status: DISCONTINUED | OUTPATIENT
Start: 2019-12-09 | End: 2019-12-09 | Stop reason: HOSPADM

## 2019-12-09 RX ORDER — HEPARIN SODIUM 200 [USP'U]/100ML
3 INJECTION, SOLUTION INTRAVENOUS CONTINUOUS
Status: DISCONTINUED | OUTPATIENT
Start: 2019-12-09 | End: 2019-12-09 | Stop reason: HOSPADM

## 2019-12-09 RX ORDER — SODIUM CHLORIDE 0.9 % (FLUSH) 0.9 %
5-40 SYRINGE (ML) INJECTION AS NEEDED
Status: DISCONTINUED | OUTPATIENT
Start: 2019-12-09 | End: 2019-12-09 | Stop reason: HOSPADM

## 2019-12-09 RX ORDER — SODIUM CHLORIDE 0.9 % (FLUSH) 0.9 %
5-40 SYRINGE (ML) INJECTION EVERY 8 HOURS
Status: DISCONTINUED | OUTPATIENT
Start: 2019-12-09 | End: 2019-12-09 | Stop reason: HOSPADM

## 2019-12-09 RX ORDER — MIDAZOLAM HYDROCHLORIDE 1 MG/ML
.5-5 INJECTION, SOLUTION INTRAMUSCULAR; INTRAVENOUS
Status: DISCONTINUED | OUTPATIENT
Start: 2019-12-09 | End: 2019-12-09 | Stop reason: HOSPADM

## 2019-12-09 RX ORDER — SODIUM CHLORIDE 9 MG/ML
250 INJECTION, SOLUTION INTRAVENOUS CONTINUOUS
Status: DISCONTINUED | OUTPATIENT
Start: 2019-12-09 | End: 2019-12-09 | Stop reason: HOSPADM

## 2019-12-09 RX ORDER — FENTANYL CITRATE 50 UG/ML
25-100 INJECTION, SOLUTION INTRAMUSCULAR; INTRAVENOUS
Status: DISCONTINUED | OUTPATIENT
Start: 2019-12-09 | End: 2019-12-09 | Stop reason: HOSPADM

## 2019-12-09 RX ORDER — LIDOCAINE HYDROCHLORIDE 10 MG/ML
5-40 INJECTION INFILTRATION; PERINEURAL
Status: DISCONTINUED | OUTPATIENT
Start: 2019-12-09 | End: 2019-12-09 | Stop reason: HOSPADM

## 2019-12-09 RX ADMIN — SODIUM CHLORIDE 75 ML/HR: 900 INJECTION, SOLUTION INTRAVENOUS at 09:33

## 2019-12-09 RX ADMIN — IOPAMIDOL 60 ML: 755 INJECTION, SOLUTION INTRAVENOUS at 11:29

## 2019-12-09 RX ADMIN — FENTANYL CITRATE 50 MCG: 50 INJECTION, SOLUTION INTRAMUSCULAR; INTRAVENOUS at 11:17

## 2019-12-09 RX ADMIN — HEPARIN SODIUM 2 ML: 10000 INJECTION INTRAVENOUS; SUBCUTANEOUS at 11:21

## 2019-12-09 RX ADMIN — LIDOCAINE HYDROCHLORIDE 2 ML: 10 INJECTION, SOLUTION INFILTRATION; PERINEURAL at 11:21

## 2019-12-09 RX ADMIN — HEPARIN SODIUM 3 ML/HR: 200 INJECTION, SOLUTION INTRAVENOUS at 11:07

## 2019-12-09 RX ADMIN — MIDAZOLAM 2 MG: 1 INJECTION INTRAMUSCULAR; INTRAVENOUS at 11:17

## 2019-12-09 RX ADMIN — ASPIRIN 81 MG 324 MG: 81 TABLET ORAL at 09:35

## 2019-12-09 NOTE — DISCHARGE INSTRUCTIONS

## 2019-12-09 NOTE — PROCEDURES
Brief Cardiac Procedure Note    Patient: Rob Buerger MRN: [id number]  SSN: [id number]    YOB: 1955  Age: 71 y.o. Sex: female      Date of Procedure: 12/9/2019     Pre-procedure Diagnosis: Typical Angina    Post-procedure Diagnosis: Non-cardiac Chest Pain    Procedure: Left Heart Catheterization    Brief Description of Procedure: See note    Performed By: Radha Madsen MD     Assistants: None    Anesthesia: Moderate Sedation    Estimated Blood Loss: Less than 10 mL      Specimens: None    Implants: None    Findings:   LV:  EF 65%  LM:  Ca++ - NML  LAD:  NML  LCx:  NML  RCA:  30% mid    Right radial     Complications: None    Recommendations: Continue medical therapy.     Signed By: Radha Madsen MD     December 9, 2019

## 2019-12-09 NOTE — PROGRESS NOTES
TRANSFER - OUT REPORT:    Verbal report given to Loly Braga RN(name) on Marv Bourne  being transferred to cpru(unit) for routine progression of care       Report consisted of patients Situation, Background, Assessment and   Recommendations(SBAR). Information from the following report(s) SBAR was reviewed with the receiving nurse. is allergic to codeine and compazine [prochlorperazine edisylate]. Opportunity for questions and clarification was provided. Procedure Summary:Pt had LHC via R wrist, site sealed with R band using 13 ml at 1130 hrs.     Med Administration    Versed:  2 mg  Fentanyl: 50 mcg    Visit Vitals  /68 (BP 1 Location: Left arm, BP Patient Position: Supine)   Pulse 69   Temp 98.2 °F (36.8 °C)   Resp 17   Ht 5' 6\" (1.676 m)   Wt 61.2 kg (135 lb)   SpO2 100%   Breastfeeding No   BMI 21.79 kg/m²     Past Medical History:   Diagnosis Date    Anxiety     SEVERE    Arthritis     Atheroembolism of left lower extremity ([address] 4/26/2016    Depression 7/5/05    Fibrocystic breast     Gallbladder disorder     GERD (gastroesophageal reflux disease)     - no current meds - does not sleep elevated- states hx of NISSEN FUND    H/O vaginitis 11/3/06    H/O atrophic vaginitis    Headache(784.0)     Hematuria 4/9/01    Herpes 4/13/10    Hypercholesterolemia     hereditary    Hypertension     Mastalgia 3/20/01    H/O abn mammogram 1996    PUD (peptic ulcer disease) 2008    hx of PUD -     Raynaud's disease     raynauds    Smoker     1 pk/day x 42 years-- cut back to 2-3 cig daily    Urinary incontinence 9/1/04    Varicose vein     Vulvar pruritus 1/7/2014           Peripheral IV 12/09/19 Right Antecubital (Active)       Peripheral IV 12/09/19 Left Antecubital (Active)

## 2019-12-09 NOTE — PROGRESS NOTES
Report received from [address]. Procedural findings communicated. Intra procedural  medication administration reviewed. Progression of care discussed.      Patient received into CPRU [place] 5 post sheath removal.     right Radial access site without bleeding or swelling     TR band dry and intact     Patient instructed to limit movement to right upper extremity    Routine post procedural vital signs and site assessment initiated

## 2019-12-09 NOTE — PROGRESS NOTES
Patient received to [address] [place] [address] room # 5  Ambulatory from [facility]. Patient scheduled for [facility] today with Dr Anita Winkler. Procedure reviewed & questions answered, voiced good understanding consent obtained & placed on chart. All medications and medical history reviewed. Will prep patient per orders. Patient & family updated on plan of care.       The patient has a fraility score of 3-MANAGING WELL, based on reports recent chest pressure

## 2019-12-09 NOTE — PROCEDURES
[facility]  CARDIAC CATH    Name:  Shorty Barnhart  MR#:  [id number]  :  1955  ACCOUNT #:  [de-identified]  DATE OF SERVICE:  2019    PRIMARY CARDIOLOGIST:  Jesús Eng MD    PRIMARY CARE PHYSICIAN:  Oxana Johnson MD    BRIEF HISTORY:  The patient is a 59-year-old female with history of elevated coronary calcium score, hypertension, dyslipidemia, tobacco abuse and family history of premature atherosclerotic coronary disease who presents with severe recurring substernal chest pain and pressure with high-risk cardiovascular risk factors. She is referred for cardiac catheterization. PREOPERATIVE DIAGNOSIS:  Atypical angina. POSTOPERATIVE DIAGNOSIS:  Noncardiac chest pain. PROCEDURES PERFORMED:  Bilateral selective coronary angiography and left ventriculography. SURGEON:  Noe Eng MD    ASSISTANT:  None. COMPLICATIONS:  None. ANESTHESIA:  Conscious sedation. Start time 11:17, end time 11:29. MEDICATIONS:  2 mg Versed, 50 mcg of fentanyl. MONITORING RN:  Thang Abad. IMPLANTS:  None. SPECIMENS REMOVED:  None. ESTIMATED BLOOD LOSS:  Less than 5 mL. PROCEDURE:  After informed consent, she was prepped and draped in the usual sterile fashion. The right wrist was infiltrated with lidocaine. The right radial artery was accessed. A 6-Taiwanese sheath was advanced. A 5-Taiwanese Tiger catheter was utilized for left and right coronary artery injections. A 5-Taiwanese angled pigtail for left ventriculography. Isovue contrast was utilized. FINDINGS:  1. Left ventricle:  Normal left ventricular size. Normal left ventricular systolic function. Ejection fraction is 65%. There is no significant mitral regurgitation. No aortic valve gradient. Left ventricular end-diastolic pressure is measured at 8 mmHg. 2.  Left main:  Left main is large, bifurcates in LAD and circumflex vessels.   Appears angiographically normal.  There is extensive calcium near the left main ostium that exists in the coronary cusp but does not equate to any stenosis. There is some distal left main external laminar calcifications, not associated with any stenosis. 3.  Left anterior descending coronary: It is a moderate sized vessel, gives rise to a large septal branch and then bifurcates distally into a septal LAD and a moderate-sized diagonal.  Both of which appear angiographically normal.  4.  Left circumflex coronary: It is a moderate-sized vessel, really a conduit to a single obtuse marginal.  Appears angiographically normal.  5.  Right coronary: It is a moderate-sized vessel. It has extensive calcium near the origin, again it is primarily in the coronary cusp but not the artery itself. There is a 30% mid stenosis, a large posterior descending and small posterolateral branch which appear angiographically normal, moderate-sized posterolateral branch appear angiographically normal.    Successful hemostasis with pneumatic radial band. CONCLUSIONS:  1. Normal left ventricular systolic function. 2.  Minimal atherosclerotic coronary disease with 30% mid right coronary artery stenosis. 3.  Extensive calcification of the coronary cusp surrounding the ostium but no stenosis near the ostium. Thank you for allowing us to participate in the care of this patient. For any questions or concerns, please feel free to contact me.       MD CORONA [email or web address]  D:  12/09/2019 11:45  T:  12/09/2019 12:35  JOB #:  [id number]  CC:  MD Karishma Perez MD

## 2019-12-19 PROBLEM — R60.0 BILATERAL LEG EDEMA: Status: ACTIVE | Noted: 2019-12-19

## 2019-12-19 PROBLEM — R60.0 LEG EDEMA, RIGHT: Status: ACTIVE | Noted: 2019-12-19

## 2020-01-29 PROBLEM — F17.200 SMOKER: Chronic | Status: ACTIVE | Noted: 2020-01-29

## 2020-07-09 ENCOUNTER — HOSPITAL ENCOUNTER (OUTPATIENT)
Dept: CT IMAGING | Age: 65
Discharge: HOME | End: 2020-07-09
Attending: INTERNAL MEDICINE
Payer: MEDICARE

## 2020-07-09 VITALS — BODY MASS INDEX: 18.96 KG/M2 | HEIGHT: 66 IN | WEIGHT: 118 LBS

## 2020-07-09 DIAGNOSIS — R93.89 ABNORMAL CT OF THE CHEST: ICD-10-CM

## 2020-07-09 LAB — CREAT BLD-MCNC: 1 MG/DL (ref 0.8–1.5)

## 2020-07-09 PROCEDURE — 74011636320 HC RX REV CODE- 636/320: Performed by: INTERNAL MEDICINE

## 2020-07-09 PROCEDURE — 71275 CT ANGIOGRAPHY CHEST: CPT

## 2020-07-09 PROCEDURE — 82565 ASSAY OF CREATININE: CPT

## 2020-07-09 PROCEDURE — 74011000258 HC RX REV CODE- 258: Performed by: INTERNAL MEDICINE

## 2020-07-09 RX ORDER — SODIUM CHLORIDE 0.9 % (FLUSH) 0.9 %
10 SYRINGE (ML) INJECTION
Status: COMPLETED | OUTPATIENT
Start: 2020-07-09 | End: 2020-07-09

## 2020-07-09 RX ADMIN — IOPAMIDOL 75 ML: 755 INJECTION, SOLUTION INTRAVENOUS at 10:49

## 2020-07-09 RX ADMIN — SODIUM CHLORIDE 100 ML: 900 INJECTION, SOLUTION INTRAVENOUS at 10:49

## 2020-07-09 RX ADMIN — Medication 10 ML: at 10:48

## 2020-09-15 ENCOUNTER — HOSPITAL ENCOUNTER (OUTPATIENT)
Dept: MAMMOGRAPHY | Age: 65
Discharge: HOME | End: 2020-09-15
Attending: INTERNAL MEDICINE
Payer: MEDICARE

## 2020-09-15 DIAGNOSIS — Z12.31 VISIT FOR SCREENING MAMMOGRAM: ICD-10-CM

## 2020-09-15 PROCEDURE — 77063 BREAST TOMOSYNTHESIS BI: CPT

## 2020-10-01 ENCOUNTER — HOSPITAL ENCOUNTER (OUTPATIENT)
Dept: MAMMOGRAPHY | Age: 65
Discharge: HOME | End: 2020-10-01
Attending: FAMILY MEDICINE
Payer: MEDICARE

## 2020-10-01 DIAGNOSIS — Z78.0 POST-MENOPAUSAL: ICD-10-CM

## 2020-10-01 PROCEDURE — 77080 DXA BONE DENSITY AXIAL: CPT

## 2020-10-21 NOTE — PROGRESS NOTES
Patient has significant osteoporosis and need to be on medication for this. Please call patient and ask her to make a virtual visit or telephone visit to discuss this further.

## 2020-11-04 ENCOUNTER — HOSPITAL ENCOUNTER (OUTPATIENT)
Dept: LAB | Age: 65
Discharge: HOME | End: 2020-11-04

## 2020-11-04 PROCEDURE — 88305 TISSUE EXAM BY PATHOLOGIST: CPT

## 2020-11-04 PROCEDURE — 88312 SPECIAL STAINS GROUP 1: CPT

## 2020-11-09 ENCOUNTER — HOSPITAL ENCOUNTER (OUTPATIENT)
Dept: INFUSION THERAPY | Age: 65
Discharge: HOME | End: 2020-11-09
Payer: MEDICARE

## 2020-11-09 VITALS
SYSTOLIC BLOOD PRESSURE: 117 MMHG | DIASTOLIC BLOOD PRESSURE: 77 MMHG | OXYGEN SATURATION: 100 % | RESPIRATION RATE: 16 BRPM | TEMPERATURE: 97.7 F | HEART RATE: 89 BPM

## 2020-11-09 LAB — CALCIUM SERPL-MCNC: 8.7 MG/DL (ref 8.3–10.4)

## 2020-11-09 PROCEDURE — 82310 ASSAY OF CALCIUM: CPT

## 2020-11-09 PROCEDURE — 96372 THER/PROPH/DIAG INJ SC/IM: CPT

## 2020-11-09 PROCEDURE — 74011250636 HC RX REV CODE- 250/636: Performed by: PHYSICIAN ASSISTANT

## 2020-11-09 PROCEDURE — 36415 COLL VENOUS BLD VENIPUNCTURE: CPT

## 2020-11-09 RX ADMIN — DENOSUMAB 60 MG: 60 INJECTION SUBCUTANEOUS at 16:20

## 2020-11-09 NOTE — PROGRESS NOTES
Arrived to the [facility]. Prolia completed.    Provided education on Prolia-first dose   Patient instructed to report any side affects to ordering provider-  Patient tolerated well  Any issues or concerns during appointment: No  Patient has no future appointments in OPI @ this time  Discharged home ambulatory

## 2021-03-12 ENCOUNTER — HOSPITAL ENCOUNTER (OUTPATIENT)
Dept: CT IMAGING | Age: 66
Discharge: HOME | End: 2021-03-12
Attending: SURGERY
Payer: MEDICARE

## 2021-03-12 DIAGNOSIS — I71.9 PENETRATING ULCER OF AORTA (HCC): ICD-10-CM

## 2021-03-12 LAB — CREAT BLD-MCNC: 1.1 MG/DL (ref 0.8–1.5)

## 2021-03-12 PROCEDURE — 74011000636 HC RX REV CODE- 636: Performed by: SURGERY

## 2021-03-12 PROCEDURE — 82565 ASSAY OF CREATININE: CPT

## 2021-03-12 PROCEDURE — 74174 CTA ABD&PLVS W/CONTRAST: CPT

## 2021-03-12 PROCEDURE — 74011000258 HC RX REV CODE- 258: Performed by: SURGERY

## 2021-03-12 RX ORDER — SODIUM CHLORIDE 0.9 % (FLUSH) 0.9 %
10 SYRINGE (ML) INJECTION
Status: COMPLETED | OUTPATIENT
Start: 2021-03-12 | End: 2021-03-12

## 2021-03-12 RX ADMIN — IOPAMIDOL 100 ML: 755 INJECTION, SOLUTION INTRAVENOUS at 08:48

## 2021-03-12 RX ADMIN — Medication 10 ML: at 08:48

## 2021-03-12 RX ADMIN — SODIUM CHLORIDE 100 ML: 900 INJECTION, SOLUTION INTRAVENOUS at 08:48

## 2021-05-13 PROBLEM — Z72.0 TOBACCO ABUSE: Status: ACTIVE | Noted: 2020-01-29

## 2021-08-20 ENCOUNTER — HOSPITAL ENCOUNTER (OUTPATIENT)
Dept: INFUSION THERAPY | Age: 66
Discharge: HOME | End: 2021-08-20
Payer: MEDICARE

## 2021-08-20 ENCOUNTER — HOSPITAL ENCOUNTER (OUTPATIENT)
Dept: LAB | Age: 66
Discharge: HOME | End: 2021-08-20

## 2021-08-20 VITALS — TEMPERATURE: 98.3 F | SYSTOLIC BLOOD PRESSURE: 137 MMHG | DIASTOLIC BLOOD PRESSURE: 87 MMHG | HEART RATE: 89 BPM

## 2021-08-20 PROCEDURE — 96372 THER/PROPH/DIAG INJ SC/IM: CPT

## 2021-08-20 PROCEDURE — 74011250636 HC RX REV CODE- 250/636: Performed by: FAMILY MEDICINE

## 2021-08-20 RX ADMIN — DENOSUMAB 60 MG: 60 INJECTION SUBCUTANEOUS at 11:07

## 2021-08-27 ENCOUNTER — TRANSCRIBE ORDER (OUTPATIENT)
Dept: SCHEDULING | Age: 66
End: 2021-08-27

## 2021-08-27 DIAGNOSIS — Z12.31 SCREENING MAMMOGRAM FOR HIGH-RISK PATIENT: Primary | ICD-10-CM

## 2021-08-30 ENCOUNTER — TRANSCRIBE ORDER (OUTPATIENT)
Dept: SCHEDULING | Age: 66
End: 2021-08-30

## 2021-08-30 DIAGNOSIS — Z12.31 SCREENING MAMMOGRAM FOR HIGH-RISK PATIENT: Primary | ICD-10-CM

## 2021-09-18 ENCOUNTER — HOSPITAL ENCOUNTER (OUTPATIENT)
Dept: MAMMOGRAPHY | Age: 66
Discharge: HOME | End: 2021-09-18
Attending: INTERNAL MEDICINE
Payer: MEDICARE

## 2021-09-18 DIAGNOSIS — Z12.31 SCREENING MAMMOGRAM FOR HIGH-RISK PATIENT: ICD-10-CM

## 2021-09-18 PROCEDURE — 77067 SCR MAMMO BI INCL CAD: CPT

## 2021-09-27 PROCEDURE — 88305 TISSUE EXAM BY PATHOLOGIST: CPT

## 2021-09-27 PROCEDURE — 88312 SPECIAL STAINS GROUP 1: CPT

## 2021-09-28 ENCOUNTER — HOSPITAL ENCOUNTER (OUTPATIENT)
Dept: LAB | Age: 66
Discharge: HOME | End: 2021-09-28

## 2021-10-28 PROBLEM — R10.31 RIGHT GROIN PAIN: Status: ACTIVE | Noted: 2021-10-28

## 2021-11-18 PROBLEM — I71.9 PENETRATING ULCER OF AORTA (HCC): Chronic | Status: ACTIVE | Noted: 2021-11-18

## 2021-12-06 PROBLEM — D69.2 SENILE PURPURA (HCC): Status: RESOLVED | Noted: 2019-08-23 | Resolved: 2021-12-06

## 2021-12-06 PROBLEM — F32.1 MODERATE MAJOR DEPRESSION (HCC): Chronic | Status: RESOLVED | Noted: 2018-04-27 | Resolved: 2021-12-06

## 2021-12-08 ENCOUNTER — HOSPITAL ENCOUNTER (OUTPATIENT)
Dept: CT IMAGING | Age: 66
Discharge: HOME | End: 2021-12-08
Attending: FAMILY MEDICINE
Payer: MEDICARE

## 2021-12-08 DIAGNOSIS — I71.9 PENETRATING ULCER OF AORTA (HCC): ICD-10-CM

## 2021-12-08 DIAGNOSIS — I73.9 PAD (PERIPHERAL ARTERY DISEASE) (HCC): ICD-10-CM

## 2021-12-08 PROCEDURE — 74011000636 HC RX REV CODE- 636: Performed by: FAMILY MEDICINE

## 2021-12-08 PROCEDURE — 71275 CT ANGIOGRAPHY CHEST: CPT

## 2021-12-08 PROCEDURE — 74011000258 HC RX REV CODE- 258: Performed by: FAMILY MEDICINE

## 2021-12-08 RX ORDER — SODIUM CHLORIDE 0.9 % (FLUSH) 0.9 %
10 SYRINGE (ML) INJECTION
Status: DISCONTINUED | OUTPATIENT
Start: 2021-12-08 | End: 2021-12-09 | Stop reason: HOSPADM

## 2021-12-08 RX ADMIN — SODIUM CHLORIDE 100 ML: 9 INJECTION, SOLUTION INTRAVENOUS at 13:14

## 2021-12-08 RX ADMIN — IOPAMIDOL 100 ML: 755 INJECTION, SOLUTION INTRAVENOUS at 13:14

## 2021-12-09 ENCOUNTER — HOSPITAL ENCOUNTER (OUTPATIENT)
Dept: GENERAL RADIOLOGY | Age: 66
Discharge: HOME | End: 2021-12-09
Attending: FAMILY MEDICINE
Payer: MEDICARE

## 2021-12-09 DIAGNOSIS — R07.81 RIB PAIN: ICD-10-CM

## 2021-12-09 DIAGNOSIS — R07.89 STERNUM PAIN: ICD-10-CM

## 2021-12-09 PROCEDURE — 71120 X-RAY EXAM BREASTBONE 2/>VWS: CPT

## 2021-12-09 PROCEDURE — 71100 X-RAY EXAM RIBS UNI 2 VIEWS: CPT

## 2021-12-09 NOTE — PROGRESS NOTES
Patient informed of results. She had EGD done one month ago with biopsy which might explain the swelling at the GE junction. Discussed trying carafate to help her pain. I told her that no fractures were noted on CT scan. Will order x-rays on sternum and ribs. Aneurysm is stable.

## 2021-12-10 NOTE — PROGRESS NOTES
Please let Mrs. Jordy Alex know that she did not break her sternum and has no rib fractures, but evidence of an old fracture of the 7th rib.

## 2022-01-15 ENCOUNTER — HOSPITAL ENCOUNTER (EMERGENCY)
Age: 67
Discharge: HOME | End: 2022-01-15
Attending: EMERGENCY MEDICINE
Payer: MEDICARE

## 2022-01-15 ENCOUNTER — APPOINTMENT (OUTPATIENT)
Dept: GENERAL RADIOLOGY | Age: 67
End: 2022-01-15
Attending: EMERGENCY MEDICINE
Payer: MEDICARE

## 2022-01-15 VITALS
WEIGHT: 119 LBS | DIASTOLIC BLOOD PRESSURE: 71 MMHG | RESPIRATION RATE: 20 BRPM | SYSTOLIC BLOOD PRESSURE: 137 MMHG | HEIGHT: 67 IN | OXYGEN SATURATION: 99 % | HEART RATE: 92 BPM | BODY MASS INDEX: 18.68 KG/M2 | TEMPERATURE: 98.3 F

## 2022-01-15 DIAGNOSIS — M54.2 MUSCULOSKELETAL NECK PAIN: ICD-10-CM

## 2022-01-15 DIAGNOSIS — M54.9 MUSCULOSKELETAL BACK PAIN: Primary | ICD-10-CM

## 2022-01-15 LAB
ALBUMIN SERPL-MCNC: 2.6 G/DL (ref 3.2–4.6)
ALBUMIN/GLOB SERPL: 0.6 {RATIO} (ref 1.2–3.5)
ALP SERPL-CCNC: 99 U/L (ref 50–130)
ALT SERPL-CCNC: 21 U/L (ref 12–65)
ANION GAP SERPL CALC-SCNC: 8 MMOL/L (ref 7–16)
AST SERPL-CCNC: 14 U/L (ref 15–37)
BASOPHILS # BLD: 0 K/UL (ref 0–0.2)
BASOPHILS NFR BLD: 0 % (ref 0–2)
BILIRUB DIRECT SERPL-MCNC: 0.3 MG/DL
BILIRUB SERPL-MCNC: 1 MG/DL (ref 0.2–1.1)
BUN SERPL-MCNC: 7 MG/DL (ref 8–23)
CALCIUM SERPL-MCNC: 8.5 MG/DL (ref 8.3–10.4)
CHLORIDE SERPL-SCNC: 105 MMOL/L (ref 98–107)
CO2 SERPL-SCNC: 25 MMOL/L (ref 21–32)
CREAT SERPL-MCNC: 0.96 MG/DL (ref 0.6–1)
DIFFERENTIAL METHOD BLD: ABNORMAL
EOSINOPHIL # BLD: 0.1 K/UL (ref 0–0.8)
EOSINOPHIL NFR BLD: 1 % (ref 0.5–7.8)
ERYTHROCYTE [DISTWIDTH] IN BLOOD BY AUTOMATED COUNT: 14 % (ref 11.9–14.6)
GLOBULIN SER CALC-MCNC: 4.4 G/DL (ref 2.3–3.5)
GLUCOSE SERPL-MCNC: 120 MG/DL (ref 65–100)
HCT VFR BLD AUTO: 39.9 % (ref 35.8–46.3)
HGB BLD-MCNC: 12.8 G/DL (ref 11.7–15.4)
IMM GRANULOCYTES # BLD AUTO: 0.1 K/UL (ref 0–0.5)
IMM GRANULOCYTES NFR BLD AUTO: 1 % (ref 0–5)
LYMPHOCYTES # BLD: 2.9 K/UL (ref 0.5–4.6)
LYMPHOCYTES NFR BLD: 16 % (ref 13–44)
MCH RBC QN AUTO: 28.9 PG (ref 26.1–32.9)
MCHC RBC AUTO-ENTMCNC: 32.1 G/DL (ref 31.4–35)
MCV RBC AUTO: 90.1 FL (ref 79.6–97.8)
MONOCYTES # BLD: 1.7 K/UL (ref 0.1–1.3)
MONOCYTES NFR BLD: 10 % (ref 4–12)
NEUTS SEG # BLD: 12.9 K/UL (ref 1.7–8.2)
NEUTS SEG NFR BLD: 73 % (ref 43–78)
NRBC # BLD: 0 K/UL (ref 0–0.2)
PLATELET # BLD AUTO: 384 K/UL (ref 150–450)
PMV BLD AUTO: 9.5 FL (ref 9.4–12.3)
POTASSIUM SERPL-SCNC: 3 MMOL/L (ref 3.5–5.1)
PROT SERPL-MCNC: 7 G/DL (ref 6.3–8.2)
RBC # BLD AUTO: 4.43 M/UL (ref 4.05–5.2)
SODIUM SERPL-SCNC: 138 MMOL/L (ref 136–145)
WBC # BLD AUTO: 17.7 K/UL (ref 4.3–11.1)

## 2022-01-15 PROCEDURE — 80048 BASIC METABOLIC PNL TOTAL CA: CPT

## 2022-01-15 PROCEDURE — 96374 THER/PROPH/DIAG INJ IV PUSH: CPT

## 2022-01-15 PROCEDURE — 80076 HEPATIC FUNCTION PANEL: CPT

## 2022-01-15 PROCEDURE — 71046 X-RAY EXAM CHEST 2 VIEWS: CPT

## 2022-01-15 PROCEDURE — 96361 HYDRATE IV INFUSION ADD-ON: CPT

## 2022-01-15 PROCEDURE — 99284 EMERGENCY DEPT VISIT MOD MDM: CPT

## 2022-01-15 PROCEDURE — 74011250636 HC RX REV CODE- 250/636: Performed by: EMERGENCY MEDICINE

## 2022-01-15 PROCEDURE — 85025 COMPLETE CBC W/AUTO DIFF WBC: CPT

## 2022-01-15 RX ORDER — DICLOFENAC SODIUM 75 MG/1
75 TABLET, DELAYED RELEASE ORAL 2 TIMES DAILY
Qty: 10 TABLET | Refills: 0 | Status: SHIPPED | OUTPATIENT
Start: 2022-01-15

## 2022-01-15 RX ORDER — SODIUM CHLORIDE 0.9 % (FLUSH) 0.9 %
5-40 SYRINGE (ML) INJECTION AS NEEDED
Status: DISCONTINUED | OUTPATIENT
Start: 2022-01-15 | End: 2022-01-15 | Stop reason: HOSPADM

## 2022-01-15 RX ORDER — CYCLOBENZAPRINE HCL 10 MG
10 TABLET ORAL 2 TIMES DAILY
Qty: 8 TABLET | Refills: 0 | Status: SHIPPED | OUTPATIENT
Start: 2022-01-15

## 2022-01-15 RX ORDER — SODIUM CHLORIDE 0.9 % (FLUSH) 0.9 %
5-40 SYRINGE (ML) INJECTION EVERY 8 HOURS
Status: DISCONTINUED | OUTPATIENT
Start: 2022-01-15 | End: 2022-01-15 | Stop reason: HOSPADM

## 2022-01-15 RX ORDER — KETOROLAC TROMETHAMINE 15 MG/ML
15 INJECTION, SOLUTION INTRAMUSCULAR; INTRAVENOUS
Status: COMPLETED | OUTPATIENT
Start: 2022-01-15 | End: 2022-01-15

## 2022-01-15 RX ADMIN — SODIUM CHLORIDE 1000 ML: 9 INJECTION, SOLUTION INTRAVENOUS at 10:16

## 2022-01-15 RX ADMIN — KETOROLAC TROMETHAMINE 15 MG: 15 INJECTION, SOLUTION INTRAMUSCULAR; INTRAVENOUS at 10:16

## 2022-01-15 NOTE — ED TRIAGE NOTES
Patient advises that she started feeling bad on 1/3 with positive COVID test on 1/4. Patient advise yesterday she started with severe lower back pain and neck pain stating hurts more to turn head side to side. Masked. Patient also advises that she had some diarrhea yesterday and felt so weak she thought she was going to pass out.

## 2022-01-15 NOTE — ED PROVIDER NOTES
70-year-old white female history of hypertension and reflux and tobacco use was diagnosed with COVID 11 days ago. Since yesterday she has had increased lower back discomfort and some neck discomfort. She states that neck pain is aggravated by rotation from side to side. Does not appear to be aggravated by flexion. No vomiting. No definite fever. No urinary changes. She is complaining of some congestion and cough. She states she has seen some blood in her sputum. The history is provided by the patient. Neck Pain   Associated symptoms include headaches. Pertinent negatives include no chest pain. Fatigue  Associated symptoms include headaches. Pertinent negatives include no shortness of breath, no chest pain, no vomiting and no nausea. Positive For Covid-19  Associated symptoms: congestion, cough and headaches    Associated symptoms: no chest pain, no dysuria, no nausea, no rash and no vomiting         Past Medical History:   Diagnosis Date    Anxiety     SEVERE    Arthritis     Atheroembolism of left lower extremity ([facility] [address] 2016    Depression 05    Fibrocystic breast     Gallbladder disorder     GERD (gastroesophageal reflux disease)     - no current meds - does not sleep elevated- states hx of NISSEN FUND    H/O vaginitis 11/3/06    H/O atrophic vaginitis    Headache(784.0)     Hematuria 01    Herpes 4/13/10    Hypercholesterolemia     hereditary    Hypertension     Mastalgia 3/20/01    H/O abn mammogram     PUD (peptic ulcer disease)     hx of PUD -     Raynaud's disease     raynauds    Smoker     1 pk/day x 42 years-- cut back to 2-3 cig daily    Urinary incontinence 04    Varicose vein     Vulvar pruritus 2014       Past Surgical History:   Procedure Laterality Date    HX APPENDECTOMY      HX BREAST RECONSTRUCTION Bilateral     BREAST LIFT/ NOT FOR CA    HX  SECTION  1993    Twins    HX CHOLECYSTECTOMY      HX COLONOSCOPY  -?  HX COLONOSCOPY  2021    HX ENDOSCOPY  2021    HX GI  2010    mel fundoplication    HX GYN  1982    D&C miscarriage    HX HEENT      fractured nose    HX HYSTERECTOMY  01/1984    HX KNEE ARTHROSCOPY Right 2005    knee scope    HX LUMBAR FUSION  2007    HX MASTOPEXY (BREAST LIFT)      2004    HX OVARIAN CYST REMOVAL  1980    Left    HX SALPINGO-OOPHORECTOMY  1976    right    HX THORACIC LAMINECTOMY  1987    ??    DE CYSTOSCOPY,RESEC EJACULATORY DUCT  2001    VASCULAR SURGERY PROCEDURE UNLIST Left 5/17/16    arteriogram         Family History:   Problem Relation Age of Onset    Heart Disease Mother     Cancer Mother         breast    Kidney Disease Mother     Dementia Mother     Breast Cancer Mother 54    Hypertension Father     Diabetes Father     Cancer Paternal Grandmother         breast, colon    Breast Cancer Paternal Grandmother     Stroke Paternal Grandfather     No Known Problems Maternal Grandmother     No Known Problems Maternal Grandfather        Social History     Socioeconomic History    Marital status:      Spouse name: Not on file    Number of children: Not on file    Years of education: Not on file    Highest education level: Not on file   Occupational History    Not on file   Tobacco Use    Smoking status: Current Some Day Smoker     Packs/day: 1.00     Years: 30.00     Pack years: 30.00     Types: Cigarettes    Smokeless tobacco: Never Used    Tobacco comment: Trying to quit    Vaping Use    Vaping Use: Never used   Substance and Sexual Activity    Alcohol use:  Yes     Alcohol/week: 1.0 standard drink     Types: 1 Standard drinks or equivalent per week     Comment: seldom    Drug use: No    Sexual activity: Yes     Partners: Male     Birth control/protection: Surgical     Comment: hyst   Other Topics Concern    Not on file   Social History Narrative    Not on file     Social Determinants of Health     Financial Resource Strain:     Difficulty of Paying Living Expenses: Not on file   Food Insecurity:     Worried About Running Out of Food in the Last Year: Not on file    Ran Out of Food in the Last Year: Not on file   Transportation Needs:     Lack of Transportation (Medical): Not on file    Lack of Transportation (Non-Medical): Not on file   Physical Activity:     Days of Exercise per Week: Not on file    Minutes of Exercise per Session: Not on file   Stress:     Feeling of Stress : Not on file   Social Connections:     Frequency of Communication with Friends and Family: Not on file    Frequency of Social Gatherings with Friends and Family: Not on file    Attends [employer] Services: Not on file    Active Member of [address] [place] [address] [employer] or Organizations: Not on file    Attends Club or Organization Meetings: Not on file    Marital Status: Not on file   Intimate Partner Violence:     Fear of Current or Ex-Partner: Not on file    Emotionally Abused: Not on file    Physically Abused: Not on file    Sexually Abused: Not on file   Housing Stability:     Unable to Pay for Housing in the Last Year: Not on file    Number of [place] in the Last Year: Not on file    Unstable Housing in the Last Year: Not on file         ALLERGIES: Codeine and Compazine [prochlorperazine edisylate]    Review of Systems   Constitutional: Positive for fatigue. HENT: Positive for congestion. Respiratory: Positive for cough. Negative for shortness of breath. Cardiovascular: Negative for chest pain. Gastrointestinal: Negative for abdominal pain, nausea and vomiting. Genitourinary: Negative for dysuria. Musculoskeletal: Positive for back pain and neck pain. Skin: Negative for rash. Neurological: Positive for headaches. All other systems reviewed and are negative.       Vitals:    01/15/22 0931   BP: 136/84   Pulse: (!) 102   Resp: 18   Temp: 97.6 °F (36.4 °C)   SpO2: 99%   Weight: 54 kg (119 lb)   Height: 5' 7\" (1.702 m)            Physical Exam  Vitals and nursing note reviewed. Constitutional:       General: She is not in acute distress. Appearance: Normal appearance. She is not toxic-appearing. HENT:      Head: Normocephalic and atraumatic. Nose: Nose normal.      Mouth/Throat:      Mouth: Mucous membranes are moist.      Pharynx: Oropharynx is clear. Eyes:      Conjunctiva/sclera: Conjunctivae normal.      Pupils: Pupils are equal, round, and reactive to light. Neck:      Comments: Diffuse cervical muscle  Cardiovascular:      Rate and Rhythm: Normal rate and regular rhythm. Pulses: Normal pulses. Pulmonary:      Effort: Pulmonary effort is normal.      Breath sounds: Normal breath sounds. No wheezing or rales. Abdominal:      General: There is no distension. Palpations: Abdomen is soft. Tenderness: There is no abdominal tenderness. There is no guarding. Musculoskeletal:         General: No swelling. Normal range of motion. Cervical back: Normal range of motion and neck supple. Tenderness present. No rigidity. Skin:     General: Skin is warm and dry. Neurological:      Mental Status: She is alert and oriented to person, place, and time. Psychiatric:         Mood and Affect: Mood normal.         Behavior: Behavior normal.          MDM  Number of Diagnoses or Management Options  Musculoskeletal back pain  Musculoskeletal neck pain  Diagnosis management comments: Patient treated with IV fluids and Toradol. Lab work shows leukocytosis 17.7 but is otherwise unremarkable. Urinalysis normal.  Chest x-ray shows emphysema without acute abnormality. Patient has no meningismus or headache. No vomiting or fever. Her neck pain is rotational suggesting musculoskeletal pain. Symptoms not consistent with meningitis. No findings at this time to warrant antibiotics. She appears safe for discharge home.   Will treat with Voltaren and Flexeril for musculoskeletal pain       Amount and/or Complexity of Data Reviewed  Clinical lab tests: ordered and reviewed  Tests in the radiology section of CPT®: ordered and reviewed  Tests in the medicine section of CPT®: ordered and reviewed  Independent visualization of images, tracings, or specimens: yes    Risk of Complications, Morbidity, and/or Mortality  Presenting problems: moderate  Diagnostic procedures: moderate  Management options: moderate           Procedures

## 2022-01-15 NOTE — ED NOTES
I have reviewed discharge instructions with the patient. The patient verbalized understanding. Patient left ED via Discharge Method: ambulatory to Home with herself. Opportunity for questions and clarification provided. Patient given 2 scripts. To continue your aftercare when you leave the hospital, you may receive an automated call from our care team to check in on how you are doing. This is a free service and part of our promise to provide the best care and service to meet your aftercare needs.  If you have questions, or wish to unsubscribe from this service please call [phone]. Thank you for Choosing our [facility] Emergency Department.

## 2022-03-01 ENCOUNTER — HOSPITAL ENCOUNTER (EMERGENCY)
Age: 67
Discharge: HOME | End: 2022-03-01
Attending: EMERGENCY MEDICINE
Payer: OTHER GOVERNMENT

## 2022-03-01 ENCOUNTER — APPOINTMENT (OUTPATIENT)
Dept: GENERAL RADIOLOGY | Age: 67
End: 2022-03-01
Attending: PHYSICIAN ASSISTANT
Payer: OTHER GOVERNMENT

## 2022-03-01 VITALS
HEART RATE: 97 BPM | RESPIRATION RATE: 16 BRPM | BODY MASS INDEX: 19.61 KG/M2 | SYSTOLIC BLOOD PRESSURE: 142 MMHG | HEIGHT: 66 IN | OXYGEN SATURATION: 100 % | TEMPERATURE: 98 F | DIASTOLIC BLOOD PRESSURE: 85 MMHG | WEIGHT: 122 LBS

## 2022-03-01 DIAGNOSIS — E87.6 HYPOKALEMIA: ICD-10-CM

## 2022-03-01 DIAGNOSIS — L03.818 CELLULITIS OF OTHER SPECIFIED SITE: Primary | ICD-10-CM

## 2022-03-01 LAB
ALBUMIN SERPL-MCNC: 2.7 G/DL (ref 3.2–4.6)
ALBUMIN/GLOB SERPL: 0.8 {RATIO} (ref 1.2–3.5)
ALP SERPL-CCNC: 93 U/L (ref 50–130)
ALT SERPL-CCNC: 16 U/L (ref 12–65)
ANION GAP SERPL CALC-SCNC: 7 MMOL/L (ref 7–16)
AST SERPL-CCNC: 19 U/L (ref 15–37)
BASOPHILS # BLD: 0.1 K/UL (ref 0–0.2)
BASOPHILS NFR BLD: 1 % (ref 0–2)
BILIRUB SERPL-MCNC: 0.4 MG/DL (ref 0.2–1.1)
BUN SERPL-MCNC: 7 MG/DL (ref 8–23)
CALCIUM SERPL-MCNC: 8.8 MG/DL (ref 8.3–10.4)
CHLORIDE SERPL-SCNC: 105 MMOL/L (ref 98–107)
CO2 SERPL-SCNC: 27 MMOL/L (ref 21–32)
CREAT SERPL-MCNC: 0.99 MG/DL (ref 0.6–1)
CRP SERPL-MCNC: 2.5 MG/DL (ref 0–0.9)
DIFFERENTIAL METHOD BLD: ABNORMAL
EOSINOPHIL # BLD: 0 K/UL (ref 0–0.8)
EOSINOPHIL NFR BLD: 0 % (ref 0.5–7.8)
ERYTHROCYTE [DISTWIDTH] IN BLOOD BY AUTOMATED COUNT: 14.2 % (ref 11.9–14.6)
GLOBULIN SER CALC-MCNC: 3.6 G/DL (ref 2.3–3.5)
GLUCOSE SERPL-MCNC: 87 MG/DL (ref 65–100)
HCT VFR BLD AUTO: 37 % (ref 35.8–46.3)
HGB BLD-MCNC: 12.1 G/DL (ref 11.7–15.4)
IMM GRANULOCYTES # BLD AUTO: 0 K/UL (ref 0–0.5)
IMM GRANULOCYTES NFR BLD AUTO: 0 % (ref 0–5)
LYMPHOCYTES # BLD: 3 K/UL (ref 0.5–4.6)
LYMPHOCYTES NFR BLD: 23 % (ref 13–44)
MCH RBC QN AUTO: 29 PG (ref 26.1–32.9)
MCHC RBC AUTO-ENTMCNC: 32.7 G/DL (ref 31.4–35)
MCV RBC AUTO: 88.7 FL (ref 79.6–97.8)
MONOCYTES # BLD: 1 K/UL (ref 0.1–1.3)
MONOCYTES NFR BLD: 8 % (ref 4–12)
NEUTS SEG # BLD: 8.6 K/UL (ref 1.7–8.2)
NEUTS SEG NFR BLD: 68 % (ref 43–78)
NRBC # BLD: 0 K/UL (ref 0–0.2)
PLATELET # BLD AUTO: 345 K/UL (ref 150–450)
PMV BLD AUTO: 9.5 FL (ref 9.4–12.3)
POTASSIUM SERPL-SCNC: 2.9 MMOL/L (ref 3.5–5.1)
PROT SERPL-MCNC: 6.3 G/DL (ref 6.3–8.2)
RBC # BLD AUTO: 4.17 M/UL (ref 4.05–5.2)
SODIUM SERPL-SCNC: 139 MMOL/L (ref 136–145)
URATE SERPL-MCNC: 5.5 MG/DL (ref 2.6–6)
WBC # BLD AUTO: 12.7 K/UL (ref 4.3–11.1)

## 2022-03-01 PROCEDURE — 80053 COMPREHEN METABOLIC PANEL: CPT

## 2022-03-01 PROCEDURE — 73110 X-RAY EXAM OF WRIST: CPT

## 2022-03-01 PROCEDURE — 84550 ASSAY OF BLOOD/URIC ACID: CPT

## 2022-03-01 PROCEDURE — 74011250637 HC RX REV CODE- 250/637: Performed by: PHYSICIAN ASSISTANT

## 2022-03-01 PROCEDURE — 85025 COMPLETE CBC W/AUTO DIFF WBC: CPT

## 2022-03-01 PROCEDURE — 86140 C-REACTIVE PROTEIN: CPT

## 2022-03-01 PROCEDURE — 99284 EMERGENCY DEPT VISIT MOD MDM: CPT

## 2022-03-01 PROCEDURE — 81003 URINALYSIS AUTO W/O SCOPE: CPT

## 2022-03-01 RX ORDER — POTASSIUM CHLORIDE 20 MEQ/1
40 TABLET, EXTENDED RELEASE ORAL
Status: COMPLETED | OUTPATIENT
Start: 2022-03-01 | End: 2022-03-01

## 2022-03-01 RX ORDER — DOXYCYCLINE HYCLATE 100 MG
100 TABLET ORAL 2 TIMES DAILY
Qty: 14 TABLET | Refills: 0 | Status: SHIPPED | OUTPATIENT
Start: 2022-03-01 | End: 2022-03-08

## 2022-03-01 RX ADMIN — POTASSIUM CHLORIDE 40 MEQ: 20 TABLET, EXTENDED RELEASE ORAL at 15:27

## 2022-03-01 NOTE — ED PROVIDER NOTES
Mask was worn during the entire patient examination. Maureen Rosario is a 75 y.o. female who presents to the ED with a chief complaint of right wrist pain and swelling. Patient reports she went to her PCP this morning and he saw that her wrist was hot and painful and sent her to the emergency department for evaluation. Patient reports this has been building over the past several days with today being the worst.  She denies any fevers, chills, body aches. She states she is able to move the wrist and use the hand however it is painful. She denies any numbness or tingling down into the hand. She denies any significant medical history that would make her immunocompromised. She denies any recent traumas or injuries. The history is provided by the patient. Wrist Pain  This is a new problem. The current episode started more than 2 days ago. The problem occurs constantly. The problem has been gradually worsening. Pertinent negatives include no chest pain, no abdominal pain, no headaches and no shortness of breath. Nothing aggravates the symptoms. Nothing relieves the symptoms. She has tried nothing for the symptoms.         Past Medical History:   Diagnosis Date    Anxiety     SEVERE    Arthritis     Atheroembolism of left lower extremity ([address] 4/26/2016    Depression 7/5/05    Fibrocystic breast     Gallbladder disorder     GERD (gastroesophageal reflux disease)     - no current meds - does not sleep elevated- states hx of NISSEN FUND    H/O vaginitis 11/3/06    H/O atrophic vaginitis    Headache(784.0)     Hematuria 4/9/01    Herpes 4/13/10    Hypercholesterolemia     hereditary    Hypertension     Mastalgia 3/20/01    H/O abn mammogram 1996    PUD (peptic ulcer disease) 2008    hx of PUD -     Raynaud's disease     raynauds    Smoker     1 pk/day x 42 years-- cut back to 2-3 cig daily    Urinary incontinence 9/1/04    Varicose vein     Vulvar pruritus 1/7/2014       Past Surgical History: Procedure Laterality Date    HX APPENDECTOMY  1971    HX BREAST RECONSTRUCTION Bilateral     BREAST LIFT/ NOT FOR CA    HX  SECTION  1993    Twins    HX CHOLECYSTECTOMY  2010    HX COLONOSCOPY  2008?  HX COLONOSCOPY      HX ENDOSCOPY      HX GI      mel fundoplication    HX GYN  1982    D&C miscarriage    HX HEENT      fractured nose    HX HYSTERECTOMY  1984    HX KNEE ARTHROSCOPY Right 2005    knee scope    HX LUMBAR FUSION  2007    HX MASTOPEXY (BREAST LIFT)      2004    HX OVARIAN CYST REMOVAL  1980    Left    HX SALPINGO-OOPHORECTOMY      right    HX THORACIC LAMINECTOMY      ??    [place] CYSTOSCOPY,RESEC EJACULATORY DUCT  2001    VASCULAR SURGERY PROCEDURE UNLIST Left 16    arteriogram         Family History:   Problem Relation Age of Onset    Heart Disease Mother     Cancer Mother         breast    Kidney Disease Mother     Dementia Mother     Breast Cancer Mother 54    Hypertension Father     Diabetes Father     Cancer Paternal Grandmother         breast, colon    Breast Cancer Paternal Grandmother     Stroke Paternal Grandfather     No Known Problems Maternal Grandmother     No Known Problems Maternal Grandfather        Social History     Socioeconomic History    Marital status:      Spouse name: Not on file    Number of children: Not on file    Years of education: Not on file    Highest education level: Not on file   Occupational History    Not on file   Tobacco Use    Smoking status: Current Some Day Smoker     Packs/day: 1.00     Years: 30.00     Pack years: 30.00     Types: Cigarettes    Smokeless tobacco: Never Used    Tobacco comment: Trying to quit    Vaping Use    Vaping Use: Never used   Substance and Sexual Activity    Alcohol use:  Yes     Alcohol/week: 1.0 standard drink     Types: 1 Standard drinks or equivalent per week     Comment: seldom    Drug use: No    Sexual activity: Yes     Partners: Male Birth control/protection: Surgical     Comment: hyst   Other Topics Concern    Not on file   Social History Narrative    Not on file     Social Determinants of Health     Financial Resource Strain:     Difficulty of Paying Living Expenses: Not on file   Food Insecurity:     Worried About Running Out of Food in the Last Year: Not on file    Jarret of Food in the Last Year: Not on file   Transportation Needs:     Lack of Transportation (Medical): Not on file    Lack of Transportation (Non-Medical): Not on file   Physical Activity:     Days of Exercise per Week: Not on file    Minutes of Exercise per Session: Not on file   Stress:     Feeling of Stress : Not on file   Social Connections:     Frequency of Communication with Friends and Family: Not on file    Frequency of Social Gatherings with Friends and Family: Not on file    Attends [employer] Services: Not on file    Active Member of [address] [place] [address] [employer] or Organizations: Not on file    Attends Club or Organization Meetings: Not on file    Marital Status: Not on file   Intimate Partner Violence:     Fear of Current or Ex-Partner: Not on file    Emotionally Abused: Not on file    Physically Abused: Not on file    Sexually Abused: Not on file   Housing Stability:     Unable to Pay for Housing in the Last Year: Not on file    Number of [place] in the Last Year: Not on file    Unstable Housing in the Last Year: Not on file         ALLERGIES: Codeine and Compazine [prochlorperazine edisylate]    Review of Systems   Constitutional: Negative for chills, fatigue and fever. Respiratory: Negative for shortness of breath. Cardiovascular: Negative for chest pain. Gastrointestinal: Negative for abdominal pain, nausea and vomiting. Musculoskeletal: Positive for arthralgias and joint swelling. Skin: Positive for color change (redness). Neurological: Negative for headaches. Psychiatric/Behavioral: Negative for agitation and behavioral problems.    All other systems reviewed and are negative. Vitals:    03/01/22 1237   BP: (!) 142/85   Pulse: 97   Resp: 16   Temp: 98 °F (36.7 °C)   SpO2: 100%   Weight: 55.3 kg (122 lb)   Height: 5' 6\" (1.676 m)            Physical Exam  Vitals and nursing note reviewed. Constitutional:       Appearance: Normal appearance. HENT:      Head: Normocephalic and atraumatic. Right Ear: External ear normal.      Left Ear: External ear normal.   Eyes:      Extraocular Movements: Extraocular movements intact. Conjunctiva/sclera: Conjunctivae normal.   Cardiovascular:      Rate and Rhythm: Normal rate and regular rhythm. Pulses: Normal pulses. Heart sounds: Normal heart sounds. Pulmonary:      Effort: Pulmonary effort is normal.      Breath sounds: Normal breath sounds. Musculoskeletal:         General: Swelling (R wrist) and tenderness (R wrist, diffusely) present. No deformity or signs of injury. Normal range of motion. Skin:     General: Skin is warm and dry. Capillary Refill: Capillary refill takes less than 2 seconds. Findings: Erythema (overlying R wrist) present. Neurological:      General: No focal deficit present. Mental Status: She is alert and oriented to person, place, and time. Psychiatric:         Mood and Affect: Mood normal.         Behavior: Behavior normal.          MDM  Number of Diagnoses or Management Options  Cellulitis of other specified site  Hypokalemia  Diagnosis management comments: 21-year-old female who presents to facility today with right wrist pain and swelling. Physical exam is noted for a edematous, erythematous, warm to the touch right wrist with range of motion intact but painful. There is no erythematous streaking, sensory deficit, or any other findings on exam.  Work-up was inclusive of a slight leukocytosis of 12.5, elevated CRP of 2.5, and CMP with mild hypokalemia of 2.9. x-ray without evidence of osteomyelitis.  Patient was given 40 mg of oral potassium in the department. I discussed with the patient the plan to start her on oral antibiotics over the course of the next week. Recommended she plan to follow-up with her PCP within the next 48 hours for reevaluation. Discussed the possibility that he may look a little worse tomorrow before starts to improve as it will take some time for the antibiotics to get on board. I took the time to discuss all these results with the patient as well as the plan of action moving forward to which the patient is agreeable. Patient was instructed to return to the emergency department if she develops fevers, chills, worsening pain or swelling, or other similar symptoms. Voice dictation software was used during the making of this note. This software is not perfect and grammatical and other typographical errors may be present. This note has been proofread, but may still contain errors. SAM Rey; 3/1/2022 @3:17 PM   ===================================================================         Amount and/or Complexity of Data Reviewed  Clinical lab tests: ordered and reviewed  Tests in the radiology section of CPT®: ordered and reviewed  Tests in the medicine section of CPT®: ordered and reviewed  Review and summarize past medical records: yes  Independent visualization of images, tracings, or specimens: yes    Risk of Complications, Morbidity, and/or Mortality  Presenting problems: moderate  Diagnostic procedures: moderate  Management options: moderate  General comments: XR WRIST RT AP/LAT/OBL MIN 3V   Final Result    Negative for acute fracture. Degenerative changes as above. The patient was observed in the ED.     Results Reviewed:      Recent Results (from the past 24 hour(s))  -CBC WITH AUTOMATED DIFF:   Collection Time: 03/01/22  1:23 PM       Result                      Value             Ref Range           WBC                         12.7 (H)          4.3 - 11.1 K*       RBC 4.17              4.05 - 5.2 M*       HGB                         12.1              11.7 - 15.4 *       HCT                         37.0              35.8 - 46.3 %       MCV                         88.7              79.6 - 97.8 *       MCH                         29.0              26.1 - 32.9 *       MCHC                        32.7              31.4 - 35.0 *       RDW                         14.2              11.9 - 14.6 %       PLATELET                    345               150 - 450 K/*       MPV                         9.5               9.4 - 12.3 FL       ABSOLUTE NRBC               0.00              0.0 - 0.2 K/*       DF                          AUTOMATED                             NEUTROPHILS                 68                43 - 78 %           LYMPHOCYTES                 23                13 - 44 %           MONOCYTES                   8                 4.0 - 12.0 %        EOSINOPHILS                 0 (L)             0.5 - 7.8 %         BASOPHILS                   1                 0.0 - 2.0 %         IMMATURE GRANULOCYTES       0                 0.0 - 5.0 %         ABS. NEUTROPHILS            8.6 (H)           1.7 - 8.2 K/*       ABS. LYMPHOCYTES            3.0               0.5 - 4.6 K/*       ABS. MONOCYTES              1.0               0.1 - 1.3 K/*       ABS. EOSINOPHILS            0.0               0.0 - 0.8 K/*       ABS. BASOPHILS              0.1               0.0 - 0.2 K/*       ABS. IMM.  GRANS.            0.0               0.0 - 0.5 K/*  -METABOLIC PANEL, COMPREHENSIVE:   Collection Time: 03/01/22  1:23 PM       Result                      Value             Ref Range           Sodium                      139               136 - 145 mm*       Potassium                   2.9 (L)           3.5 - 5.1 mm*       Chloride                    105               98 - 107 mmo*       CO2                         27                21 - 32 mmol*       Anion gap                   7                 7 - 16 mmol/L       Glucose                     87                65 - 100 mg/*       BUN                         7 (L)             8 - 23 MG/DL        Creatinine                  0.99              0.6 - 1.0 MG*       GFR est AA                  >60               >60 ml/min/1*       GFR est non-AA              59 (L)            >60 ml/min/1*       Calcium                     8.8               8.3 - 10.4 M*       Bilirubin, total            0.4               0.2 - 1.1 MG*       ALT (SGPT)                  16                12 - 65 U/L         AST (SGOT)                  19                15 - 37 U/L         Alk. phosphatase            93                50 - 130 U/L        Protein, total              6.3               6.3 - 8.2 g/*       Albumin                     2.7 (L)           3.2 - 4.6 g/*       Globulin                    3.6 (H)           2.3 - 3.5 g/*       A-G Ratio                   0.8 (L)           1.2 - 3.5      -URIC ACID:   Collection Time: 03/01/22  1:23 PM       Result                      Value             Ref Range           Uric acid                   5.5               2.6 - 6.0 MG*  -C REACTIVE PROTEIN, QT:   Collection Time: 03/01/22  1:23 PM       Result                      Value             Ref Range           C-Reactive protein          2.5 (H)           0.0 - 0.9 mg*    I discussed the results of all labs, procedures, radiographs, and treatments with the patient and available family. Treatment plan is agreed upon and the patient is ready for discharge. All voiced understanding of the discharge plan and medication instructions or changes as appropriate. Questions about treatment in the ED were answered. All were encouraged to return should symptoms worsen or new problems develop.       Patient Progress  Patient progress: stable         Procedures

## 2022-03-01 NOTE — ED NOTES
I have reviewed discharge instructions with the patient. The patient verbalized understanding. Patient left ED via Discharge Method: ambulatory to Home with self. Opportunity for questions and clarification provided. Patient given 1 scripts. To continue your aftercare when you leave the hospital, you may receive an automated call from our care team to check in on how you are doing. This is a free service and part of our promise to provide the best care and service to meet your aftercare needs.  If you have questions, or wish to unsubscribe from this service please call [phone]. Thank you for Choosing our [facility] Emergency Department.

## 2022-03-01 NOTE — ED TRIAGE NOTES
Pt reports pain to right hand, wrist and forearm. Pt was sent here from primary care. Pt denies injury or trauma. Pt states area is red, pt reports she had bloodwork drawn from this area at [facility] [address] [facility].     Ramiro Rebolledo RN

## 2022-03-18 PROBLEM — L30.9 DERMATITIS: Status: ACTIVE | Noted: 2018-11-13

## 2022-03-18 PROBLEM — M81.0 AGE-RELATED OSTEOPOROSIS WITHOUT CURRENT PATHOLOGICAL FRACTURE: Status: ACTIVE | Noted: 2018-09-28

## 2022-03-18 PROBLEM — I25.10 CORONARY ARTERY DISEASE INVOLVING NATIVE CORONARY ARTERY OF NATIVE HEART WITHOUT ANGINA PECTORIS: Status: ACTIVE | Noted: 2018-12-17

## 2022-03-18 PROBLEM — R10.31 RIGHT GROIN PAIN: Status: ACTIVE | Noted: 2021-10-28

## 2022-03-19 PROBLEM — Z98.890 HISTORY OF NISSEN FUNDOPLICATION: Status: ACTIVE | Noted: 2018-04-27

## 2022-03-19 PROBLEM — R60.0 LEG EDEMA, RIGHT: Status: ACTIVE | Noted: 2019-12-19

## 2022-03-19 PROBLEM — Z72.0 TOBACCO ABUSE: Status: ACTIVE | Noted: 2020-01-29

## 2022-03-19 PROBLEM — I10 ESSENTIAL HYPERTENSION: Status: ACTIVE | Noted: 2018-10-17

## 2022-03-19 PROBLEM — F41.9 ANXIETY: Status: ACTIVE | Noted: 2018-11-13

## 2022-03-19 PROBLEM — E78.00 PURE HYPERCHOLESTEROLEMIA: Status: ACTIVE | Noted: 2017-04-27

## 2022-03-19 PROBLEM — Z80.0 FAMILY HISTORY OF MALIGNANT NEOPLASM OF GASTROINTESTINAL TRACT: Status: ACTIVE | Noted: 2018-04-27

## 2022-03-19 PROBLEM — M25.561 RIGHT KNEE PAIN: Status: ACTIVE | Noted: 2019-05-10

## 2022-03-19 PROBLEM — K63.5 BENIGN COLON POLYP: Status: ACTIVE | Noted: 2018-04-27

## 2022-03-19 PROBLEM — Z63.0 MARITAL CONFLICT: Status: ACTIVE | Noted: 2018-10-17

## 2022-03-19 PROBLEM — I71.9 PENETRATING ULCER OF AORTA (HCC): Status: ACTIVE | Noted: 2021-11-18

## 2022-03-20 PROBLEM — R07.2 PRECORDIAL PAIN: Status: ACTIVE | Noted: 2018-10-17

## 2022-04-21 ENCOUNTER — HOSPITAL ENCOUNTER (EMERGENCY)
Age: 67
Discharge: LEFT BEFORE DISCHARGE | End: 2022-04-21
Payer: MEDICARE

## 2022-04-21 PROCEDURE — 75810000275 HC EMERGENCY DEPT VISIT NO LEVEL OF CARE

## 2022-04-28 ENCOUNTER — HOSPITAL ENCOUNTER (OUTPATIENT)
Dept: CT IMAGING | Age: 67
Discharge: HOME | End: 2022-04-28
Attending: SURGERY
Payer: MEDICARE

## 2022-04-28 DIAGNOSIS — I73.9 PVD (PERIPHERAL VASCULAR DISEASE) WITH CLAUDICATION (HCC): ICD-10-CM

## 2022-04-28 LAB — CREAT BLD-MCNC: 0.95 MG/DL (ref 0.8–1.5)

## 2022-04-28 PROCEDURE — 82565 ASSAY OF CREATININE: CPT

## 2022-04-28 PROCEDURE — 74011000636 HC RX REV CODE- 636: Performed by: SURGERY

## 2022-04-28 PROCEDURE — 75635 CT ANGIO ABDOMINAL ARTERIES: CPT

## 2022-04-28 PROCEDURE — 74011000258 HC RX REV CODE- 258: Performed by: SURGERY

## 2022-04-28 RX ORDER — SODIUM CHLORIDE 0.9 % (FLUSH) 0.9 %
10 SYRINGE (ML) INJECTION
Status: COMPLETED | OUTPATIENT
Start: 2022-04-28 | End: 2022-04-28

## 2022-04-28 RX ADMIN — IOPAMIDOL 100 ML: 755 INJECTION, SOLUTION INTRAVENOUS at 11:38

## 2022-04-28 RX ADMIN — Medication 10 ML: at 11:39

## 2022-04-28 RX ADMIN — SODIUM CHLORIDE 100 ML: 9 INJECTION, SOLUTION INTRAVENOUS at 11:39

## 2022-05-20 PROBLEM — N18.30 CHRONIC RENAL DISEASE, STAGE III (HCC): Status: ACTIVE | Noted: 2022-05-20

## 2022-06-09 ENCOUNTER — TELEPHONE (OUTPATIENT)
Dept: CARDIOLOGY CLINIC | Age: 67
End: 2022-06-09

## 2022-06-09 RX ORDER — NITROGLYCERIN 0.4 MG/1
0.4 TABLET SUBLINGUAL EVERY 5 MIN PRN
Qty: 25 TABLET | Refills: 11 | Status: SHIPPED | OUTPATIENT
Start: 2022-06-09

## 2022-06-09 NOTE — TELEPHONE ENCOUNTER
Requested Prescriptions     Signed Prescriptions Disp Refills    nitroGLYCERIN (NITROSTAT) 0.4 MG SL tablet 25 tablet 11     Sig: Place 1 tablet under the tongue every 5 minutes as needed for Chest pain     Authorizing Provider: Rima Jefferson     Ordering User: Sienna Jenkins

## 2022-11-15 RX ORDER — FLUCONAZOLE 150 MG/1
TABLET ORAL
Qty: 2 TABLET | Refills: 4 | OUTPATIENT
Start: 2022-11-15

## 2023-03-29 ENCOUNTER — HOSPITAL ENCOUNTER (OUTPATIENT)
Dept: MAMMOGRAPHY | Age: 68
Discharge: HOME | End: 2023-04-01
Admitting: NURSE PRACTITIONER
Payer: OTHER GOVERNMENT

## 2023-03-29 DIAGNOSIS — Z12.31 VISIT FOR SCREENING MAMMOGRAM: ICD-10-CM

## 2023-03-29 PROCEDURE — 77067 SCR MAMMO BI INCL CAD: CPT

## 2023-11-16 ENCOUNTER — HOSPITAL ENCOUNTER (EMERGENCY)
Age: 68
Discharge: HOME | End: 2023-11-16
Payer: OTHER GOVERNMENT

## 2023-11-16 ENCOUNTER — APPOINTMENT (OUTPATIENT)
Dept: GENERAL RADIOLOGY | Age: 68
End: 2023-11-16
Payer: OTHER GOVERNMENT

## 2023-11-16 VITALS
SYSTOLIC BLOOD PRESSURE: 152 MMHG | HEART RATE: 88 BPM | DIASTOLIC BLOOD PRESSURE: 76 MMHG | WEIGHT: 125 LBS | TEMPERATURE: 98.7 F | RESPIRATION RATE: 17 BRPM | BODY MASS INDEX: 20.09 KG/M2 | HEIGHT: 66 IN | OXYGEN SATURATION: 99 %

## 2023-11-16 DIAGNOSIS — I80.8 SUPERFICIAL THROMBOPHLEBITIS OF RIGHT UPPER EXTREMITY: Primary | ICD-10-CM

## 2023-11-16 LAB
ANION GAP SERPL CALC-SCNC: 13 MMOL/L (ref 2–11)
BASOPHILS # BLD: 0.1 K/UL (ref 0–0.2)
BASOPHILS NFR BLD: 1 % (ref 0–2)
BUN SERPL-MCNC: 8 MG/DL (ref 8–23)
CALCIUM SERPL-MCNC: 9.2 MG/DL (ref 8.3–10.4)
CHLORIDE SERPL-SCNC: 104 MMOL/L (ref 98–107)
CO2 SERPL-SCNC: 23 MMOL/L (ref 21–32)
CREAT SERPL-MCNC: 1.06 MG/DL (ref 0.6–1)
DIFFERENTIAL METHOD BLD: ABNORMAL
EOSINOPHIL # BLD: 0.1 K/UL (ref 0–0.8)
EOSINOPHIL NFR BLD: 1 % (ref 0.5–7.8)
ERYTHROCYTE [DISTWIDTH] IN BLOOD BY AUTOMATED COUNT: 14.7 % (ref 11.9–14.6)
GLUCOSE SERPL-MCNC: 88 MG/DL (ref 65–100)
HCT VFR BLD AUTO: 37.8 % (ref 35.8–46.3)
HGB BLD-MCNC: 12.3 G/DL (ref 11.7–15.4)
IMM GRANULOCYTES # BLD AUTO: 0 K/UL (ref 0–0.5)
IMM GRANULOCYTES NFR BLD AUTO: 0 % (ref 0–5)
LYMPHOCYTES # BLD: 2 K/UL (ref 0.5–4.6)
LYMPHOCYTES NFR BLD: 16 % (ref 13–44)
MCH RBC QN AUTO: 28.9 PG (ref 26.1–32.9)
MCHC RBC AUTO-ENTMCNC: 32.5 G/DL (ref 31.4–35)
MCV RBC AUTO: 88.9 FL (ref 82–102)
MONOCYTES # BLD: 0.7 K/UL (ref 0.1–1.3)
MONOCYTES NFR BLD: 6 % (ref 4–12)
NEUTS SEG # BLD: 9.1 K/UL (ref 1.7–8.2)
NEUTS SEG NFR BLD: 76 % (ref 43–78)
NRBC # BLD: 0 K/UL (ref 0–0.2)
PLATELET # BLD AUTO: 461 K/UL (ref 150–450)
PMV BLD AUTO: 8.8 FL (ref 9.4–12.3)
POTASSIUM SERPL-SCNC: 3.4 MMOL/L (ref 3.5–5.1)
PROCALCITONIN SERPL-MCNC: 0.05 NG/ML (ref 0–0.49)
RBC # BLD AUTO: 4.25 M/UL (ref 4.05–5.2)
SODIUM SERPL-SCNC: 140 MMOL/L (ref 133–143)
WBC # BLD AUTO: 12 K/UL (ref 4.3–11.1)

## 2023-11-16 PROCEDURE — 6370000000 HC RX 637 (ALT 250 FOR IP): Performed by: STUDENT IN AN ORGANIZED HEALTH CARE EDUCATION/TRAINING PROGRAM

## 2023-11-16 PROCEDURE — 80048 BASIC METABOLIC PNL TOTAL CA: CPT

## 2023-11-16 PROCEDURE — 96375 TX/PRO/DX INJ NEW DRUG ADDON: CPT

## 2023-11-16 PROCEDURE — 73110 X-RAY EXAM OF WRIST: CPT

## 2023-11-16 PROCEDURE — 84145 PROCALCITONIN (PCT): CPT

## 2023-11-16 PROCEDURE — 6360000002 HC RX W HCPCS: Performed by: STUDENT IN AN ORGANIZED HEALTH CARE EDUCATION/TRAINING PROGRAM

## 2023-11-16 PROCEDURE — 85025 COMPLETE CBC W/AUTO DIFF WBC: CPT

## 2023-11-16 PROCEDURE — 99284 EMERGENCY DEPT VISIT MOD MDM: CPT

## 2023-11-16 PROCEDURE — 96374 THER/PROPH/DIAG INJ IV PUSH: CPT

## 2023-11-16 RX ORDER — ONDANSETRON 2 MG/ML
4 INJECTION INTRAMUSCULAR; INTRAVENOUS
Status: COMPLETED | OUTPATIENT
Start: 2023-11-16 | End: 2023-11-16

## 2023-11-16 RX ORDER — MELOXICAM 15 MG/1
15 TABLET ORAL DAILY
Qty: 30 TABLET | Refills: 0 | Status: SHIPPED | OUTPATIENT
Start: 2023-11-16 | End: 2023-12-16

## 2023-11-16 RX ORDER — CEPHALEXIN 500 MG/1
500 CAPSULE ORAL
Status: COMPLETED | OUTPATIENT
Start: 2023-11-16 | End: 2023-11-16

## 2023-11-16 RX ORDER — CEPHALEXIN 500 MG/1
500 CAPSULE ORAL 4 TIMES DAILY
Qty: 28 CAPSULE | Refills: 0 | Status: SHIPPED | OUTPATIENT
Start: 2023-11-16 | End: 2023-11-23

## 2023-11-16 RX ORDER — OXYCODONE HYDROCHLORIDE 5 MG/1
5 TABLET ORAL EVERY 6 HOURS PRN
Qty: 12 TABLET | Refills: 0 | Status: SHIPPED | OUTPATIENT
Start: 2023-11-16 | End: 2023-11-19

## 2023-11-16 RX ADMIN — CEPHALEXIN 500 MG: 500 CAPSULE ORAL at 17:14

## 2023-11-16 RX ADMIN — ONDANSETRON 4 MG: 2 INJECTION INTRAMUSCULAR; INTRAVENOUS at 17:10

## 2023-11-16 RX ADMIN — FENTANYL CITRATE 50 MCG: 50 INJECTION INTRAMUSCULAR; INTRAVENOUS at 17:12

## 2023-11-16 ASSESSMENT — ENCOUNTER SYMPTOMS
PHOTOPHOBIA: 0
TROUBLE SWALLOWING: 0
ABDOMINAL PAIN: 0
FACIAL SWELLING: 0
VOMITING: 0
COUGH: 0
SHORTNESS OF BREATH: 0

## 2023-11-16 ASSESSMENT — PAIN DESCRIPTION - ORIENTATION
ORIENTATION: RIGHT
ORIENTATION: RIGHT

## 2023-11-16 ASSESSMENT — PAIN DESCRIPTION - LOCATION
LOCATION: WRIST
LOCATION: WRIST

## 2023-11-16 ASSESSMENT — PAIN SCALES - GENERAL
PAINLEVEL_OUTOF10: 10
PAINLEVEL_OUTOF10: 10
PAINLEVEL_OUTOF10: 8

## 2023-11-16 ASSESSMENT — PAIN - FUNCTIONAL ASSESSMENT
PAIN_FUNCTIONAL_ASSESSMENT: 0-10
PAIN_FUNCTIONAL_ASSESSMENT: 0-10

## 2023-11-16 NOTE — ED TRIAGE NOTES
Pt ambulatory to triage for reports of R wrist pain. Pt states she had blood drawn on Tuesday & since she has had severe pain. Pt with splint in place. Pt with swelling noted to L posterior hand. Pt states she took lortab approximately 1 hour ago without any relief. Pt denies any other trauma or injury.

## 2023-11-16 NOTE — ED NOTES
Patient given warm blankets and an ice pack to wrist for comfort.      Raymondo Schwab, RN  11/16/23 [id number]

## 2023-11-16 NOTE — DISCHARGE INSTRUCTIONS
As we discussed, I suspect the pain you are experiencing is secondary to your recent blood draw. This is a possible complication of getting blood drawn. This should resolve on its own with the help of warm compresses and NSAIDs. You may use of oxycodone to help control your pain. Do not combine this with any other narcotics including Lortab. Use Mobic once daily for pain. Do not take Advil while taking this medication. As we discussed, we will cover you for any possible infection with Keflex. Please take it for the full course. Return here for new or worsening symptoms. Risk of opioid analgesics include, but are not limited to: Overdosing can stop or slow your breathing and lead to death; fractures from falls; drowsiness leading to injury; tolerance, dependence and addiction. You should not operate any motorized vehicles or work from a height greater than ground level when taking opioid analgesics as they increase your fall risks. Do not combine these medications with any other opioid, to include street opioids such as heroin. Do not combine these medications with benzodiazepines like Xanax or alcohol as this may cause severe respiratory depression and death. As we discussed, I did not find a life threatening cause of your symptoms today. However, THAT DOES NOT MEAN IT COULD NOT DEVELOP. If you develop ANY new or worsening symptoms, it is critical that you return for re-evaluation. This includes any symptoms that are concerning to you, especially symptoms such as fevers, red streaking up your arm, severe pain, numbness. If you do not return for re-evaluation, you risk serious complications, including death.

## 2023-11-16 NOTE — ED PROVIDER NOTES
Med      cyclobenzaprine (FLEXERIL) 10 MG tablet Take 10 mg by mouth 2 times dailyHistorical Med      diclofenac (VOLTAREN) 75 MG EC tablet Take 1 tablet by mouth 2 times dailyHistorical Med      fexofenadine (ALLEGRA) 180 MG tablet Take 1 tablet by mouth dailyHistorical Med      HYDROcodone-acetaminophen (NORCO)  MG per tablet Take 1 tablet by mouth every 6 hours as needed. Historical Med      pantoprazole (PROTONIX) 40 MG tablet Take 40 mg by mouth dailyHistorical Med      rosuvastatin (CRESTOR) 5 MG tablet Take 5 mg by mouthHistorical Med      sucralfate (CARAFATE) 1 GM tablet Take 1 g by mouth 4 times dailyHistorical Med      tretinoin (RETIN-A) 0.05 % cream Apply 1 Film topically, Topical, Starting Tue 11/13/2018, Historical Med              Results for orders placed or performed during the hospital encounter of 11/16/23   XR WRIST RIGHT (MIN 3 VIEWS)    Narrative    EXAMINATION: Right Wrist    HISTORY: R wrist pain. TECHNIQUE: Frontal, lateral, and oblique views of the right wrist.    COMPARISON: 3/1/2022    FINDINGS:   There is no evidence of acute fracture or dislocation. Joint spaces are maintained. There is narrowing of the radiocarpal joint. The bones appear osteopenic. Soft tissues are within normal limits. No radiopaque foreign body. Impression    No evidence of acute fracture or dislocation within the right wrist. Chronic  changes.       CBC with Auto Differential   Result Value Ref Range    WBC 12.0 (H) 4.3 - 11.1 K/uL    RBC 4.25 4.05 - 5.20 M/uL    Hemoglobin 12.3 11.7 - 15.4 g/dL    Hematocrit 37.8 35.8 - 46.3 %    MCV 88.9 82.0 - 102.0 FL    MCH 28.9 26.1 - 32.9 PG    MCHC 32.5 31.4 - 35.0 g/dL    RDW 14.7 (H) 11.9 - 14.6 %    Platelets 672 (H) 306 - 450 K/uL    MPV 8.8 (L) 9.4 - 12.3 FL    nRBC 0.00 0.0 - 0.2 K/uL    Differential Type AUTOMATED      Neutrophils % 76 43 - 78 %    Lymphocytes % 16 13 - 44 %    Monocytes % 6 4.0 - 12.0 %    Eosinophils % 1 0.5 - 7.8 %

## 2024-02-01 NOTE — PROGRESS NOTES
Arrived to infusion. Prolia given and tolerated well. Denies new issues or concerns. Educated patient on prolia injection. No future infusions appointment scheduled at this time. Patient aware will need new order from MD.   Discharged ambulatory with self. Report using a coping skill to overcome sadness and worry in order to socialize with peers daily Report using a coping skill to overcome sadness and worry in order to socialize with peers daily Report using a coping skill to overcome sadness and worry in order to socialize with peers daily Report using a coping skill to overcome sadness and worry in order to socialize with peers daily Report using a coping skill to overcome sadness and worry in order to socialize with peers daily Report using a coping skill to overcome sadness and worry in order to socialize with peers daily Report using a coping skill to overcome sadness and worry in order to socialize with peers daily Report using a coping skill to overcome sadness and worry in order to socialize with peers daily Report using a coping skill to overcome sadness and worry in order to socialize with peers daily

## 2024-03-27 ENCOUNTER — OFFICE VISIT (OUTPATIENT)
Age: 69
End: 2024-03-27
Payer: MEDICARE

## 2024-03-27 ENCOUNTER — NURSE ONLY (OUTPATIENT)
Age: 69
End: 2024-03-27

## 2024-03-27 VITALS
BODY MASS INDEX: 20.89 KG/M2 | WEIGHT: 130 LBS | SYSTOLIC BLOOD PRESSURE: 138 MMHG | HEIGHT: 66 IN | DIASTOLIC BLOOD PRESSURE: 90 MMHG | HEART RATE: 75 BPM

## 2024-03-27 DIAGNOSIS — I10 ESSENTIAL (PRIMARY) HYPERTENSION: Primary | ICD-10-CM

## 2024-03-27 DIAGNOSIS — R07.9 CHEST PAIN: ICD-10-CM

## 2024-03-27 DIAGNOSIS — I10 ESSENTIAL HYPERTENSION: Primary | ICD-10-CM

## 2024-03-27 PROCEDURE — 93000 ELECTROCARDIOGRAM COMPLETE: CPT | Performed by: INTERNAL MEDICINE

## 2024-03-27 RX ORDER — NITROGLYCERIN 0.4 MG/1
0.4 TABLET SUBLINGUAL EVERY 5 MIN PRN
Qty: 25 TABLET | Refills: 11 | Status: CANCELLED | OUTPATIENT
Start: 2024-03-27

## 2024-03-27 RX ORDER — TIZANIDINE 4 MG/1
4 TABLET ORAL 3 TIMES DAILY
COMMUNITY
Start: 2024-03-22

## 2024-03-27 RX ORDER — PANTOPRAZOLE SODIUM 40 MG/1
40 TABLET, DELAYED RELEASE ORAL DAILY
Qty: 90 TABLET | Refills: 1 | Status: CANCELLED | OUTPATIENT
Start: 2024-03-27

## 2024-03-27 NOTE — PROGRESS NOTES
Patient came out of room and states she has another appointment she needs to go to. Informed patient that she is being worked-in and might have to wait a bit longer.   Patient decided to r/s. Advised pt to go to the ER if symptoms worsen.

## 2024-03-28 RX ORDER — NITROGLYCERIN 0.4 MG/1
0.4 TABLET SUBLINGUAL EVERY 5 MIN PRN
Qty: 25 TABLET | Refills: 11 | Status: SHIPPED | OUTPATIENT
Start: 2024-03-28

## 2024-03-28 NOTE — TELEPHONE ENCOUNTER
PT would like a call to know if her test results came back abnormal. PT would also like to know if Dr PÉREZ would like to see her sooner than June.

## 2024-03-28 NOTE — TELEPHONE ENCOUNTER
Patient came in for EKG yesterday due to chest discomfort. EKG reviewed by Dr. Rowell. Patient was added onto Dr. Jones schedule to be seen, however patient had to leave before being seen.     Patient needs new prescription of Nitroglycerin sent to pharmacy.

## 2024-05-21 ENCOUNTER — HOSPITAL ENCOUNTER (EMERGENCY)
Dept: ULTRASOUND IMAGING | Age: 69
Discharge: HOME | End: 2024-05-23
Payer: MEDICARE

## 2024-05-21 ENCOUNTER — APPOINTMENT (OUTPATIENT)
Dept: ULTRASOUND IMAGING | Age: 69
End: 2024-05-21
Payer: MEDICARE

## 2024-05-21 ENCOUNTER — HOSPITAL ENCOUNTER (EMERGENCY)
Age: 69
Discharge: HOME | End: 2024-05-21
Attending: EMERGENCY MEDICINE
Payer: MEDICARE

## 2024-05-21 VITALS
WEIGHT: 135 LBS | HEART RATE: 87 BPM | DIASTOLIC BLOOD PRESSURE: 85 MMHG | TEMPERATURE: 97.2 F | RESPIRATION RATE: 15 BRPM | HEIGHT: 66 IN | BODY MASS INDEX: 21.69 KG/M2 | OXYGEN SATURATION: 99 % | SYSTOLIC BLOOD PRESSURE: 147 MMHG

## 2024-05-21 DIAGNOSIS — I73.9 PAD (PERIPHERAL ARTERY DISEASE) (HCC): ICD-10-CM

## 2024-05-21 DIAGNOSIS — R20.9 COLD FOOT: Primary | ICD-10-CM

## 2024-05-21 LAB
ALBUMIN SERPL-MCNC: 3.8 G/DL (ref 3.2–4.6)
ALBUMIN/GLOB SERPL: 1.6 (ref 0.4–1.6)
ALP SERPL-CCNC: 123 U/L (ref 45–117)
ALT SERPL-CCNC: 7 U/L (ref 13–61)
ANION GAP SERPL CALC-SCNC: 11 MMOL/L (ref 2–11)
AST SERPL-CCNC: 16 U/L (ref 15–37)
BASOPHILS # BLD: 0.1 K/UL (ref 0–0.2)
BASOPHILS NFR BLD: 1 % (ref 0–2)
BILIRUB SERPL-MCNC: 0.3 MG/DL (ref 0.2–1.1)
BUN SERPL-MCNC: 7 MG/DL (ref 8–23)
CALCIUM SERPL-MCNC: 9.1 MG/DL (ref 8.3–10.4)
CHLORIDE SERPL-SCNC: 105 MMOL/L (ref 98–107)
CO2 SERPL-SCNC: 24 MMOL/L (ref 21–32)
CREAT SERPL-MCNC: 0.99 MG/DL (ref 0.6–1)
DIFFERENTIAL METHOD BLD: ABNORMAL
EOSINOPHIL # BLD: 0.2 K/UL (ref 0–0.8)
EOSINOPHIL NFR BLD: 2 % (ref 0.5–7.8)
ERYTHROCYTE [DISTWIDTH] IN BLOOD BY AUTOMATED COUNT: 15.1 % (ref 11.9–14.6)
GLOBULIN SER CALC-MCNC: 2.4 G/DL (ref 2.8–4.5)
GLUCOSE SERPL-MCNC: 71 MG/DL (ref 65–100)
HCT VFR BLD AUTO: 37.1 % (ref 35.8–46.3)
HGB BLD-MCNC: 12.1 G/DL (ref 11.7–15.4)
IMM GRANULOCYTES # BLD AUTO: 0 K/UL (ref 0–0.5)
IMM GRANULOCYTES NFR BLD AUTO: 0 % (ref 0–5)
LYMPHOCYTES # BLD: 3.9 K/UL (ref 0.5–4.6)
LYMPHOCYTES NFR BLD: 40 % (ref 13–44)
MCH RBC QN AUTO: 28.6 PG (ref 26.1–32.9)
MCHC RBC AUTO-ENTMCNC: 32.6 G/DL (ref 31.4–35)
MCV RBC AUTO: 87.7 FL (ref 82–102)
MONOCYTES # BLD: 0.6 K/UL (ref 0.1–1.3)
MONOCYTES NFR BLD: 6 % (ref 4–12)
NEUTS SEG # BLD: 5 K/UL (ref 1.7–8.2)
NEUTS SEG NFR BLD: 51 % (ref 43–78)
NRBC # BLD: 0 K/UL (ref 0–0.2)
PLATELET # BLD AUTO: 362 K/UL (ref 150–450)
PMV BLD AUTO: 8.9 FL (ref 9.4–12.3)
POTASSIUM SERPL-SCNC: 3.6 MMOL/L (ref 3.5–5.1)
PROT SERPL-MCNC: 6.2 G/DL (ref 6.4–8.2)
RBC # BLD AUTO: 4.23 M/UL (ref 4.05–5.2)
SODIUM SERPL-SCNC: 140 MMOL/L (ref 133–143)
WBC # BLD AUTO: 9.7 K/UL (ref 4.3–11.1)

## 2024-05-21 PROCEDURE — 93925 LOWER EXTREMITY STUDY: CPT

## 2024-05-21 PROCEDURE — 99284 EMERGENCY DEPT VISIT MOD MDM: CPT

## 2024-05-21 PROCEDURE — 85025 COMPLETE CBC W/AUTO DIFF WBC: CPT

## 2024-05-21 PROCEDURE — 80053 COMPREHEN METABOLIC PANEL: CPT

## 2024-05-21 PROCEDURE — 93971 EXTREMITY STUDY: CPT

## 2024-05-21 ASSESSMENT — PAIN - FUNCTIONAL ASSESSMENT: PAIN_FUNCTIONAL_ASSESSMENT: NONE - DENIES PAIN

## 2024-05-21 ASSESSMENT — LIFESTYLE VARIABLES
HOW MANY STANDARD DRINKS CONTAINING ALCOHOL DO YOU HAVE ON A TYPICAL DAY: 1 OR 2
HOW OFTEN DO YOU HAVE A DRINK CONTAINING ALCOHOL: MONTHLY OR LESS

## 2024-05-21 NOTE — ED TRIAGE NOTES
Pt ambulatory to ED with c/o thinking something bit her left foot while raking outside on 05/20/2024. Pt cap refill in toes is greater than three seconds. Pitting edema and discoloration noted to left foot and toes. No acute distress at present.

## 2024-05-21 NOTE — DISCHARGE INSTRUCTIONS
Please call Dr. Peña office this morning to schedule an appointment.  Return immediately for any recurring or worsening symptoms

## 2024-05-21 NOTE — ED PROVIDER NOTES
Emergency Department Provider Note       PCP: Mariah Monk MD   Age: 69 y.o.   Sex: female     DISPOSITION Decision To Discharge 05/21/2024 05:48:01 AM       ICD-10-CM    1. Cold foot  R20.9 Vascular duplex lower extremity arteries bilateral     Vascular duplex lower extremity arteries bilateral      2. PAD (peripheral artery disease) ([facility])  I73.9           Medical Decision Making     Patient is a 69-year-old female with history of PAD managed by Dr. Ziyad Peña status post left iliac angioplasty and stent proxy 78 years ago per patient who presents with bluish discoloration left foot and left toes she states since 7 AM initially worse now.  Patient states she has a dull ache sensation to left foot.  According to  at bedside patient has had this intermittently for years however according to the patient this has not.      Patient states she thought possibly a snake did bite her as she was raking the leaves but she has no puncture wounds and does not really know if she actually saw snake.  Patient does have history of anxiety, arterial thrombus of left lower extremity, HLD, HTN, PUD, HTN, Raynaud's disease, tobacco us    Patient's physical exam is remarkable for left foot distal aspect as well as toes discoloration.  Patient has a difficult to assess dorsalis pedis pulse but does have a posterior tibial pulse on exam as well as by ultrasound.  Patient's Doppler left lower extremity is negative for DVT and bilateral arterial Dopplers negative for acute occlusion and no acute findings.  Patient feels better.  Given patient's history of Raynaud's and is her stating that she at times has bluish discolorations of her foot seems to be more chronic in nature.  We will DC home and have patient follow-up with Dr. Peña.  They have instructed to call his office today for follow-up.  All questions answered.  ED Course as of 05/21/24 0548   Tue May 21, 2024   0548 Patient's symptoms have improved and patient

## 2024-05-24 ENCOUNTER — OFFICE VISIT (OUTPATIENT)
Dept: VASCULAR SURGERY | Age: 69
End: 2024-05-24
Payer: MEDICARE

## 2024-05-24 VITALS
WEIGHT: 124 LBS | OXYGEN SATURATION: 98 % | HEIGHT: 66 IN | HEART RATE: 81 BPM | SYSTOLIC BLOOD PRESSURE: 175 MMHG | BODY MASS INDEX: 19.93 KG/M2 | DIASTOLIC BLOOD PRESSURE: 109 MMHG

## 2024-05-24 DIAGNOSIS — I73.9 PVD (PERIPHERAL VASCULAR DISEASE) WITH CLAUDICATION (HCC): Primary | ICD-10-CM

## 2024-05-24 PROCEDURE — 99213 OFFICE O/P EST LOW 20 MIN: CPT | Performed by: SURGERY

## 2024-05-24 PROCEDURE — 3077F SYST BP >= 140 MM HG: CPT | Performed by: SURGERY

## 2024-05-24 PROCEDURE — 1123F ACP DISCUSS/DSCN MKR DOCD: CPT | Performed by: SURGERY

## 2024-05-24 PROCEDURE — 3080F DIAST BP >= 90 MM HG: CPT | Performed by: SURGERY

## 2024-05-24 NOTE — PROGRESS NOTES
[address] [address]  [phone] FAX: [phone]    Keira Grigsby  : 1955    Chief Complaint:     History of Present Illness:   Patient follows up today for follow-up duplex study.  Patient to the ER with discoloration of left foot.  She stepped she states she got bit by a snake or bug recently.  She is well-known to me status post left iliac angioplasty and stent.    CURRENT MEDICATIONS:  Current Outpatient Medications   Medication Sig Dispense Refill    nitroGLYCERIN (NITROSTAT) 0.4 MG SL tablet Place 1 tablet under the tongue every 5 minutes as needed for Chest pain 25 tablet 11    aspirin 81 MG EC tablet Take 1 tablet by mouth daily      Cholecalciferol 50 MCG (2000) TABS Take 1 tablet by mouth daily      fexofenadine (ALLEGRA) 180 MG tablet Take 1 tablet by mouth daily      HYDROcodone-acetaminophen (NORCO)  MG per tablet Take 1 tablet by mouth every 6 hours as needed.      tiZANidine (ZANAFLEX) 4 MG tablet Take 1 tablet by mouth 3 times daily (Patient not taking: Reported on 2024)      meloxicam (MOBIC) 15 MG tablet Take 1 tablet by mouth daily 30 tablet 0    cyanocobalamin 1000 MCG/ML injection Inject 1,000 mcg into the muscle every 7 days (Patient not taking: Reported on 2023)      cyclobenzaprine (FLEXERIL) 10 MG tablet Take 10 mg by mouth 2 times daily (Patient not taking: Reported on 2023)      diclofenac (VOLTAREN) 75 MG EC tablet Take 1 tablet by mouth 2 times daily (Patient not taking: Reported on 3/27/2024)      pantoprazole (PROTONIX) 40 MG tablet Take 40 mg by mouth daily (Patient not taking: Reported on 2023)      rosuvastatin (CRESTOR) 5 MG tablet Take 5 mg by mouth (Patient not taking: Reported on 2023)      sucralfate (CARAFATE) 1 GM tablet Take 1 g by mouth 4 times daily (Patient not taking: Reported on 2023)      tretinoin (RETIN-A) 0.05 % cream Apply 1 Film topically (Patient not taking:

## 2024-08-09 ENCOUNTER — TELEPHONE (OUTPATIENT)
Dept: VASCULAR SURGERY | Age: 69
End: 2024-08-09

## 2024-08-09 ENCOUNTER — OFFICE VISIT (OUTPATIENT)
Dept: VASCULAR SURGERY | Age: 69
End: 2024-08-09
Payer: MEDICARE

## 2024-08-09 VITALS
BODY MASS INDEX: 20.73 KG/M2 | HEIGHT: 66 IN | WEIGHT: 129 LBS | HEART RATE: 80 BPM | OXYGEN SATURATION: 96 % | DIASTOLIC BLOOD PRESSURE: 71 MMHG | SYSTOLIC BLOOD PRESSURE: 149 MMHG

## 2024-08-09 DIAGNOSIS — I73.9 PVD (PERIPHERAL VASCULAR DISEASE) WITH CLAUDICATION (HCC): Primary | ICD-10-CM

## 2024-08-09 PROCEDURE — 3078F DIAST BP <80 MM HG: CPT | Performed by: SURGERY

## 2024-08-09 PROCEDURE — 3077F SYST BP >= 140 MM HG: CPT | Performed by: SURGERY

## 2024-08-09 PROCEDURE — 1123F ACP DISCUSS/DSCN MKR DOCD: CPT | Performed by: SURGERY

## 2024-08-09 PROCEDURE — 99213 OFFICE O/P EST LOW 20 MIN: CPT | Performed by: SURGERY

## 2024-08-09 NOTE — TELEPHONE ENCOUNTER
Pt lmvm nurse line c/o up all night w/foot pain and discolored    -lmvm x2 that she needs to come in NOW (we leave early today)  She needs u/ LE arterial and see DTW

## 2024-08-09 NOTE — PROGRESS NOTES
breast     Gallbladder disorder     GERD (gastroesophageal reflux disease)     - no current meds - does not sleep elevated- states hx of NISSEN FUND    H/O vaginitis 11/3/06    H/O atrophic vaginitis    Headache(784.0)     Hematuria 4/9/01    Herpes 4/13/10    Hypercholesterolemia     hereditary    Hypertension     Mastalgia 3/20/01    H/O abn mammogram 1996    PUD (peptic ulcer disease) 2008    hx of PUD -     Raynaud's disease     raynauds    Smoker     1 pk/day x 42 years-- cut back to 2-3 cig daily    Urinary incontinence 9/1/04    Varicose vein     Vulvar pruritus 1/7/2014       Physical Examination:   Height: 1.676 m (5' 6\"), Weight - Scale: 58.5 kg (129 lb), BP: (!) 149/71    Constitutional: she appears well-developed. No distress.   HENT:   Head: Atraumatic.   Eyes: Pupils are equal, round, and reactive to light.   Neck: Normal range of motion.   Cardiovascular: Regular rhythm.    Pulmonary/Chest: Effort normal and breath sounds normal. No respiratory distress.   Abdominal: Soft. Bowel sounds are normal. she exhibits no distension. There is no tenderness. There is no guarding. No hernia.   Musculoskeletal: Normal range of motion.   Neurological: She is alert. CN II- XII grossly intact   Vascular: Femoral pulses Doppler pedal pulses discoloration of right first toe    Imaging:    Study show patent iliac SFA with 50 to  and a right popliteal artery and monophasic tibial flow stenosis      Recommendations/Plans:   Ms. Keira Grigsby is a 69 y.o. year old female with worsening right popliteal disease and blue toe syndrome will order a CTA runoff to evaluate her popliteal artery to see if that need to be intervened as potential source of her discoloration.  Yoni RUTH MD    Elements of this note have been dictated using speech recognition software. As a result, errors of speech recognition may have occurred.    20 minutes of time was spent on this encounter including chart review, assessment,

## 2024-08-13 ENCOUNTER — HOSPITAL ENCOUNTER (OUTPATIENT)
Dept: CT IMAGING | Age: 69
Discharge: HOME | End: 2024-08-15
Attending: SURGERY
Payer: MEDICARE

## 2024-08-13 DIAGNOSIS — I73.9 PVD (PERIPHERAL VASCULAR DISEASE) WITH CLAUDICATION (HCC): ICD-10-CM

## 2024-08-13 PROCEDURE — 75635 CT ANGIO ABDOMINAL ARTERIES: CPT

## 2024-08-13 PROCEDURE — 6360000004 HC RX CONTRAST MEDICATION: Performed by: SURGERY

## 2024-08-13 RX ADMIN — IOPAMIDOL 100 ML: 755 INJECTION, SOLUTION INTRAVENOUS at 08:07

## 2024-08-14 ENCOUNTER — PREP FOR PROCEDURE (OUTPATIENT)
Dept: VASCULAR SURGERY | Age: 69
End: 2024-08-14

## 2024-08-14 DIAGNOSIS — I73.9 PVD (PERIPHERAL VASCULAR DISEASE) (HCC): ICD-10-CM

## 2024-09-04 ENCOUNTER — TELEPHONE (OUTPATIENT)
Dept: VASCULAR SURGERY | Age: 69
End: 2024-09-04

## 2024-09-04 NOTE — TELEPHONE ENCOUNTER
Called pt to see how she's doing after her infection. She said she's doing better, but the area needs to heal a little more. I told her Dr. Peña wanted to see if she'd be up to coming to get her procedure done tomorrow. She said I could call her back tomorrow and we can talk about another date.

## 2024-09-10 ENCOUNTER — TELEPHONE (OUTPATIENT)
Dept: VASCULAR SURGERY | Age: 69
End: 2024-09-10

## 2024-11-21 ENCOUNTER — TELEPHONE (OUTPATIENT)
Dept: VASCULAR SURGERY | Age: 69
End: 2024-11-21

## 2024-11-21 NOTE — TELEPHONE ENCOUNTER
Pt called to move her November 6mth arterial. She had been scheduled for angioplasty and stent in August but this was cancelled due to infection. Per Dr. Peña, she can push out the arterial another 6 mths and she should call back if she experiences pain. Pt ok to move appt to May and will call back if she wants to have appt sooner due to pain. Pt reports that her condition is improving.

## 2024-12-04 ENCOUNTER — TELEPHONE (OUTPATIENT)
Dept: VASCULAR SURGERY | Age: 69
End: 2024-12-04

## 2024-12-19 ENCOUNTER — HOSPITAL ENCOUNTER (OUTPATIENT)
Dept: MAMMOGRAPHY | Age: 69
Discharge: HOME | End: 2024-12-22
Payer: OTHER GOVERNMENT

## 2024-12-19 DIAGNOSIS — Z12.31 ENCOUNTER FOR SCREENING MAMMOGRAM FOR HIGH-RISK PATIENT: ICD-10-CM

## 2024-12-19 PROCEDURE — 77067 SCR MAMMO BI INCL CAD: CPT

## 2024-12-19 PROCEDURE — 77063 BREAST TOMOSYNTHESIS BI: CPT

## 2025-03-21 ENCOUNTER — HOSPITAL ENCOUNTER (EMERGENCY)
Age: 70
Discharge: HOME | End: 2025-03-21
Payer: OTHER GOVERNMENT

## 2025-03-21 ENCOUNTER — APPOINTMENT (OUTPATIENT)
Dept: GENERAL RADIOLOGY | Age: 70
End: 2025-03-21
Payer: OTHER GOVERNMENT

## 2025-03-21 VITALS
HEIGHT: 66 IN | WEIGHT: 135 LBS | BODY MASS INDEX: 21.69 KG/M2 | HEART RATE: 79 BPM | TEMPERATURE: 98.9 F | RESPIRATION RATE: 18 BRPM | DIASTOLIC BLOOD PRESSURE: 80 MMHG | OXYGEN SATURATION: 95 % | SYSTOLIC BLOOD PRESSURE: 148 MMHG

## 2025-03-21 DIAGNOSIS — J06.9 VIRAL URI WITH COUGH: Primary | ICD-10-CM

## 2025-03-21 DIAGNOSIS — U07.1 COVID-19: ICD-10-CM

## 2025-03-21 LAB
FLUAV RNA SPEC QL NAA+PROBE: NOT DETECTED
FLUBV RNA SPEC QL NAA+PROBE: NOT DETECTED
SARS-COV-2 RDRP RESP QL NAA+PROBE: DETECTED
SOURCE: ABNORMAL

## 2025-03-21 PROCEDURE — 87636 SARSCOV2 & INF A&B AMP PRB: CPT

## 2025-03-21 PROCEDURE — 99284 EMERGENCY DEPT VISIT MOD MDM: CPT

## 2025-03-21 PROCEDURE — 71045 X-RAY EXAM CHEST 1 VIEW: CPT

## 2025-03-21 PROCEDURE — 6370000000 HC RX 637 (ALT 250 FOR IP): Performed by: NURSE PRACTITIONER

## 2025-03-21 RX ORDER — BENZONATATE 100 MG/1
200 CAPSULE ORAL
Status: COMPLETED | OUTPATIENT
Start: 2025-03-21 | End: 2025-03-21

## 2025-03-21 RX ORDER — ALBUTEROL SULFATE 90 UG/1
2 INHALANT RESPIRATORY (INHALATION) 4 TIMES DAILY PRN
Qty: 18 G | Refills: 0 | Status: SHIPPED | OUTPATIENT
Start: 2025-03-21

## 2025-03-21 RX ORDER — BENZONATATE 100 MG/1
100 CAPSULE ORAL 3 TIMES DAILY PRN
Qty: 30 CAPSULE | Refills: 0 | Status: SHIPPED | OUTPATIENT
Start: 2025-03-21 | End: 2025-03-31

## 2025-03-21 RX ADMIN — BENZONATATE 200 MG: 100 CAPSULE ORAL at 17:13

## 2025-03-21 ASSESSMENT — PAIN - FUNCTIONAL ASSESSMENT: PAIN_FUNCTIONAL_ASSESSMENT: NONE - DENIES PAIN

## 2025-03-21 NOTE — ED PROVIDER NOTES
Emergency Department Provider Note       PCP: File, Not On   Age: 70 y.o.   Sex: female     DISPOSITION Decision To Discharge 03/21/2025 06:20:51 PM    ICD-10-CM    1. Viral URI with cough  J06.9       2. COVID-19  U07.1           Medical Decision Making     70-year-old white female with a past medical history of depression, hypertension, GERD, involvement of allergies, smoker, hyperlipidemia, anxiety, peptic ulcer disease, CKD stage III, PVD, Raynaud's phenomenon, presents emergency room with a chief complaint of not being able to get over the phone.  She states that she has had a cough and congestion for about a week now.  Denies any fevers chills sore throat body aches or headaches.  States she has had a constant postnasal drip and a cough that is wet at times.  But she does not bring up much with it.  She denies any shortness of breath or dyspnea on exertion.  Denies any chest pain.  Denies any abdominal pain nausea vomiting or diarrhea.    Patient seen and evaluated in the emergency department.  Vital signs are reassuring.  She is afebrile nontoxic with normal vitals.  Will obtain COVID, flu  COVID, flu and a chest x-ray.  Will give Tessalon for the cough.  Working diagnosis of URI, postnasal drip, environmental allergies, bronchitis, pneumonia.    Patient's COVID is positive.  Chest x-ray is negative.  Sats are 98% on room air.  Will obtain ambulatory sats.  The patient does.    I reviewed all findings with the patient in detail.  Treatment is symptomatic at this time since she has had this illness for a little over a week now.  I advised that she should be turning a corner and be through with that in the next few days.    Ambulatory O2 sats 93 to 94% on room air.  Patient states he is not short of breath has no dyspnea on exertion.  And she is eager for discharge home.  Will prescribe Tessalon for the cough as well as an albuterol inhaler.  I reviewed all findings with the patient in detail as well as all  [facility], [facility]    Intimate Partner Violence     Fear of Current or Ex-Partner: Not asked     Emotionally Abused: Not asked     Physically Abused: Not asked     Sexually Abused: Not asked   Housing Stability: Not At Risk (3/9/2022)    Received from [facility], [facility]    Housing Stability     Was there a time when you did not have a steady place to sleep: Unrecognized value     Worried that the place you are staying is making you sick: Unrecognized value        Previous Medications    ASPIRIN 81 MG EC TABLET    Take 1 tablet by mouth daily    CHOLECALCIFEROL 50 MCG (2000 UT) TABS    Take 1 tablet by mouth daily    CYANOCOBALAMIN 1000 MCG/ML INJECTION    Inject 1,000 mcg into the muscle every 7 days    CYCLOBENZAPRINE (FLEXERIL) 10 MG TABLET    Take 10 mg by mouth 2 times daily    FEXOFENADINE (ALLEGRA) 180 MG TABLET    Take 1 tablet by mouth daily    HYDROCODONE-ACETAMINOPHEN (NORCO)  MG PER TABLET    Take 1 tablet by mouth every 6 hours as needed.    MELOXICAM (MOBIC) 15 MG TABLET    Take 1 tablet by mouth daily    NITROGLYCERIN (NITROSTAT) 0.4 MG SL TABLET    Place 1 tablet under the tongue every 5 minutes as needed for Chest pain    PANTOPRAZOLE (PROTONIX) 40 MG TABLET    Take 1 tablet by mouth daily    ROSUVASTATIN (CRESTOR) 5 MG TABLET    Take 1 tablet by mouth    SUCRALFATE (CARAFATE) 1 GM TABLET    Take 1 g by mouth 4 times daily    TIZANIDINE (ZANAFLEX) 4 MG TABLET    Take 1 tablet by mouth 3 times daily    TRETINOIN (RETIN-A) 0.05 % CREAM    Apply 1 Film topically        Results from this emergency department visit:      Results for orders placed or performed during the hospital encounter of 03/21/25   COVID-19 & Influenza Combo    Specimen: Swab   Result Value Ref Range    Source NASAL      SARS-CoV-2, Rapid Detected (A) NOTD      Influenza A, LIZ Not detected NOTD      Influenza B, LIZ Not detected NOTD     XR CHEST PORTABLE    Narrative    Chest X-ray    INDICATION:

## 2025-03-21 NOTE — DISCHARGE INSTRUCTIONS
Rest is much as possible  Use Tessalon every 8 hours as needed for any cough  Albuterol inhaler 2 puffs every 6 hours as needed for any cough shortness of breath or wheezing  Per CDC guidelines you should stay self quarantine in your home wearing a mask around others in the home for 5 days and then you must be fever free for 24 to 48 hours and symptoms improving before rejoining the general public  Patient to follow-up with your PCP this week via televisit for recheck  If you have any worsening in your symptoms, chest pain, shortness of breath, dyspnea exertion, fever over 100.5, or any other worsening conditions return to the ER right away

## 2025-03-21 NOTE — ED NOTES
Patient ambulatory with steady gait around unit. O2 sats measured 93% on room air. Provider aware.

## 2025-03-21 NOTE — ED TRIAGE NOTES
Patient arrives to the ER via POV. Patients states she can't get over the funk, cough, and congestion for a week. Denies fever, sore throat, and headache.

## 2025-05-05 ENCOUNTER — HOSPITAL ENCOUNTER (EMERGENCY)
Age: 70
Discharge: HOME | End: 2025-05-05
Payer: OTHER GOVERNMENT

## 2025-05-05 VITALS
SYSTOLIC BLOOD PRESSURE: 143 MMHG | WEIGHT: 130 LBS | HEIGHT: 66 IN | TEMPERATURE: 97.9 F | BODY MASS INDEX: 20.89 KG/M2 | RESPIRATION RATE: 16 BRPM | DIASTOLIC BLOOD PRESSURE: 83 MMHG | HEART RATE: 103 BPM | OXYGEN SATURATION: 99 %

## 2025-05-05 DIAGNOSIS — S81.819A LACERATION OF SHIN: Primary | ICD-10-CM

## 2025-05-05 PROCEDURE — 99282 EMERGENCY DEPT VISIT SF MDM: CPT

## 2025-05-05 ASSESSMENT — PAIN DESCRIPTION - ORIENTATION: ORIENTATION: RIGHT;LOWER

## 2025-05-05 ASSESSMENT — PAIN DESCRIPTION - LOCATION: LOCATION: LEG

## 2025-05-05 ASSESSMENT — PAIN - FUNCTIONAL ASSESSMENT: PAIN_FUNCTIONAL_ASSESSMENT: 0-10

## 2025-05-05 ASSESSMENT — PAIN SCALES - GENERAL: PAINLEVEL_OUTOF10: 3

## 2025-05-05 NOTE — DISCHARGE INSTRUCTIONS
As discussed, please keep the wound clean and dry.  If you notice any signs of infection such as redness, warmth, fever, pain, please return to the ER for further evaluation and treatment.    We would love to help you get a primary care doctor for follow-up after your emergency department visit.    Please call [phone] between 7AM - 6PM Monday to Friday.  A care navigator will be able to assist you with setting up a doctor close to your home.

## 2025-05-05 NOTE — ED TRIAGE NOTES
Was cut on the RL leg by a garden tiller. Doesn't remember last tetanus shot. Bleeding well controlled in triage. Ambulatory alert and in no acute distress

## 2025-05-05 NOTE — ED PROVIDER NOTES
Emergency Department Provider Note       [facility] EMERGENCY DEPT   PCP: Michele, Not On, MD   Age: 70 y.o.   Sex: female     DISPOSITION Discharge - Pending Orders Complete 05/05/2025 04:51:54 PM    ICD-10-CM    1. Laceration of shin  S81.819A           Medical Decision Making     Patient presenting with scrape type laceration to the right shin (see below).  Patient hypertensive and tachycardic.  In no distress upon evaluation.  Laceration not repairable due to sloughing of skin and poor approximation.  Wound thoroughly irrigated with wound cleanser, nonadherent bandage with antibiotic ointment applied.  Patient given strict return precautions, acknowledged understanding.  Advised to follow-up with primary care provider for continued management.  Constitutionally stable for discharge at this time.     1 acute, uncomplicated illness or injury.  Over the counter drug management performed.  Patient was discharged risks and benefits of hospitalization were considered.  Shared medical decision making was utilized in creating the patients health plan today.  I independently ordered and reviewed each unique test.                         History     70-year-old female presents with chief complaint of right shin laceration from a garden tool earlier this afternoon.  Reports that she was working in her flower bed and tried to remove a garden soil tiller with multiple spikes on the bottom, one of the spikes hit her shin resulting in the subsequent laceration and open wound.  Bleeding controlled at time of evaluation with direct pressure.  Patient uncertain of last tetanus shot, amenable to updating at this time. Denies blood thinners. Denies bony tenderness, inability to ambulate, pain, nausea, or any additional constitutional symptoms at this time.    Per chart review, last tetanus 8/7/2024    The history is provided by the patient. No  was used.     Physical Exam     Vitals signs and nursing note

## 2025-05-09 ENCOUNTER — OFFICE VISIT (OUTPATIENT)
Dept: FAMILY MEDICINE CLINIC | Facility: CLINIC | Age: 70
End: 2025-05-09
Payer: MEDICARE

## 2025-05-09 VITALS
DIASTOLIC BLOOD PRESSURE: 78 MMHG | WEIGHT: 125 LBS | SYSTOLIC BLOOD PRESSURE: 128 MMHG | TEMPERATURE: 98.4 F | HEART RATE: 88 BPM | OXYGEN SATURATION: 97 % | BODY MASS INDEX: 20.83 KG/M2 | HEIGHT: 65 IN

## 2025-05-09 DIAGNOSIS — M81.0 AGE-RELATED OSTEOPOROSIS WITHOUT CURRENT PATHOLOGICAL FRACTURE: ICD-10-CM

## 2025-05-09 DIAGNOSIS — K64.9 HEMORRHOIDS, UNSPECIFIED HEMORRHOID TYPE: ICD-10-CM

## 2025-05-09 DIAGNOSIS — N18.2 STAGE 2 CHRONIC KIDNEY DISEASE: ICD-10-CM

## 2025-05-09 DIAGNOSIS — L98.8 AGE-RELATED FACIAL WRINKLES: ICD-10-CM

## 2025-05-09 DIAGNOSIS — I25.10 CORONARY ARTERY DISEASE INVOLVING NATIVE CORONARY ARTERY OF NATIVE HEART WITHOUT ANGINA PECTORIS: Primary | ICD-10-CM

## 2025-05-09 PROBLEM — N18.30 CHRONIC RENAL DISEASE, STAGE III (HCC): Status: RESOLVED | Noted: 2022-05-20 | Resolved: 2025-05-09

## 2025-05-09 PROCEDURE — 99203 OFFICE O/P NEW LOW 30 MIN: CPT | Performed by: FAMILY MEDICINE

## 2025-05-09 PROCEDURE — 1123F ACP DISCUSS/DSCN MKR DOCD: CPT | Performed by: FAMILY MEDICINE

## 2025-05-09 PROCEDURE — 1159F MED LIST DOCD IN RCRD: CPT | Performed by: FAMILY MEDICINE

## 2025-05-09 PROCEDURE — 3074F SYST BP LT 130 MM HG: CPT | Performed by: FAMILY MEDICINE

## 2025-05-09 PROCEDURE — 3078F DIAST BP <80 MM HG: CPT | Performed by: FAMILY MEDICINE

## 2025-05-09 RX ORDER — ANTACID TABLETS 500 MG/1
2 TABLET, CHEWABLE ORAL NIGHTLY
COMMUNITY

## 2025-05-09 RX ORDER — MULTIVITAMIN,THERAPEUTIC
1 TABLET ORAL DAILY
COMMUNITY

## 2025-05-09 RX ORDER — ACETAMINOPHEN 160 MG
2000 TABLET,DISINTEGRATING ORAL DAILY
Qty: 90 CAPSULE | Refills: 3 | Status: SHIPPED | OUTPATIENT
Start: 2025-05-09

## 2025-05-09 RX ORDER — HYDROCORTISONE ACETATE 25 MG/1
25 SUPPOSITORY RECTAL 2 TIMES DAILY PRN
COMMUNITY
End: 2025-05-09 | Stop reason: SDUPTHER

## 2025-05-09 RX ORDER — POTASSIUM CHLORIDE 750 MG/1
10 CAPSULE, EXTENDED RELEASE ORAL DAILY
COMMUNITY

## 2025-05-09 RX ORDER — NITROGLYCERIN 0.4 MG/1
0.4 TABLET SUBLINGUAL EVERY 5 MIN PRN
Qty: 25 TABLET | Refills: 0 | Status: SHIPPED | OUTPATIENT
Start: 2025-05-09

## 2025-05-09 RX ORDER — ACETAMINOPHEN 160 MG
2000 TABLET,DISINTEGRATING ORAL DAILY
COMMUNITY
End: 2025-05-09 | Stop reason: SDUPTHER

## 2025-05-09 RX ORDER — TRETINOIN 0.5 MG/G
CREAM TOPICAL
Qty: 20 G | Refills: 1 | Status: SHIPPED | OUTPATIENT
Start: 2025-05-09

## 2025-05-09 RX ORDER — HYDROCORTISONE ACETATE 25 MG/1
25 SUPPOSITORY RECTAL 2 TIMES DAILY PRN
Qty: 12 SUPPOSITORY | Refills: 1 | Status: SHIPPED | OUTPATIENT
Start: 2025-05-09

## 2025-05-09 SDOH — ECONOMIC STABILITY: FOOD INSECURITY: WITHIN THE PAST 12 MONTHS, THE FOOD YOU BOUGHT JUST DIDN'T LAST AND YOU DIDN'T HAVE MONEY TO GET MORE.: NEVER TRUE

## 2025-05-09 SDOH — ECONOMIC STABILITY: FOOD INSECURITY: WITHIN THE PAST 12 MONTHS, YOU WORRIED THAT YOUR FOOD WOULD RUN OUT BEFORE YOU GOT MONEY TO BUY MORE.: NEVER TRUE

## 2025-05-09 ASSESSMENT — ENCOUNTER SYMPTOMS
DIARRHEA: 0
COUGH: 1
BLOOD IN STOOL: 0
SORE THROAT: 0
WHEEZING: 0
CONSTIPATION: 0
SHORTNESS OF BREATH: 0

## 2025-05-09 ASSESSMENT — PATIENT HEALTH QUESTIONNAIRE - PHQ9
10. IF YOU CHECKED OFF ANY PROBLEMS, HOW DIFFICULT HAVE THESE PROBLEMS MADE IT FOR YOU TO DO YOUR WORK, TAKE CARE OF THINGS AT HOME, OR GET ALONG WITH OTHER PEOPLE: NOT DIFFICULT AT ALL
9. THOUGHTS THAT YOU WOULD BE BETTER OFF DEAD, OR OF HURTING YOURSELF: NOT AT ALL
SUM OF ALL RESPONSES TO PHQ QUESTIONS 1-9: 0
5. POOR APPETITE OR OVEREATING: NOT AT ALL
1. LITTLE INTEREST OR PLEASURE IN DOING THINGS: NOT AT ALL
SUM OF ALL RESPONSES TO PHQ QUESTIONS 1-9: 0
4. FEELING TIRED OR HAVING LITTLE ENERGY: NOT AT ALL
3. TROUBLE FALLING OR STAYING ASLEEP: NOT AT ALL
SUM OF ALL RESPONSES TO PHQ QUESTIONS 1-9: 0
7. TROUBLE CONCENTRATING ON THINGS, SUCH AS READING THE NEWSPAPER OR WATCHING TELEVISION: NOT AT ALL
SUM OF ALL RESPONSES TO PHQ QUESTIONS 1-9: 0
6. FEELING BAD ABOUT YOURSELF - OR THAT YOU ARE A FAILURE OR HAVE LET YOURSELF OR YOUR FAMILY DOWN: NOT AT ALL
2. FEELING DOWN, DEPRESSED OR HOPELESS: NOT AT ALL
8. MOVING OR SPEAKING SO SLOWLY THAT OTHER PEOPLE COULD HAVE NOTICED. OR THE OPPOSITE, BEING SO FIGETY OR RESTLESS THAT YOU HAVE BEEN MOVING AROUND A LOT MORE THAN USUAL: NOT AT ALL

## 2025-05-09 NOTE — PROGRESS NOTES
Keira Grigsby (:  1955) is a 70 y.o. female,New patient, here for evaluation of the following chief complaint(s):  New Patient and Hemorrhoids         Assessment & Plan  Coronary artery disease involving native coronary artery of native heart without angina pectoris   Chronic, at goal (stable), continue current treatment plan, medication adherence emphasized, and lifestyle modifications recommended    Orders:    nitroGLYCERIN (NITROSTAT) 0.4 MG SL tablet; Place 1 tablet under the tongue every 5 minutes as needed for Chest pain    Stage 2 chronic kidney disease   Chronic, at goal (stable), continue current treatment plan, medication adherence emphasized, and lifestyle modifications recommended         Age-related osteoporosis without current pathological fracture   Chronic, at goal (stable), continue current treatment plan, medication adherence emphasized, and lifestyle modifications recommended    Orders:    vitamin D (VITAMIN D3) 50 MCG (2000 UT) CAPS capsule; Take 1 capsule by mouth daily    Hemorrhoids, unspecified hemorrhoid type   Chronic, at goal (stable), continue current treatment plan, medication adherence emphasized, and lifestyle modifications recommended    Orders:    hydrocortisone (ANUSOL-HC) 25 MG suppository; Place 1 suppository rectally 2 times daily as needed for Hemorrhoids    Age-related facial wrinkles   Chronic, at goal (stable), continue current treatment plan and medication adherence emphasized    Orders:    tretinoin (RETIN-A) 0.05 % cream; Apply sparingly to affected areas at bedtime 20 minutes after washing face.      No follow-ups on file.       Subjective   Hemorrhoids  Associated symptoms include arthralgias, congestion and coughing. Pertinent negatives include no chest pain, fatigue, rash or sore throat.       Coronary artery disease-patient has a history of coronary artery disease and is currently taking aspirin 81 mg, 1 p.o. daily, rosuvastatin 5 mg, 1 p.o. daily,

## 2025-05-09 NOTE — ASSESSMENT & PLAN NOTE
Chronic, at goal (stable), continue current treatment plan, medication adherence emphasized, and lifestyle modifications recommended    Orders:    vitamin D (VITAMIN D3) 50 MCG (2000 UT) CAPS capsule; Take 1 capsule by mouth daily

## 2025-05-09 NOTE — ASSESSMENT & PLAN NOTE
Chronic, at goal (stable), continue current treatment plan, medication adherence emphasized, and lifestyle modifications recommended    Orders:    nitroGLYCERIN (NITROSTAT) 0.4 MG SL tablet; Place 1 tablet under the tongue every 5 minutes as needed for Chest pain

## 2025-05-16 ENCOUNTER — TELEPHONE (OUTPATIENT)
Dept: FAMILY MEDICINE CLINIC | Facility: CLINIC | Age: 70
End: 2025-05-16

## 2025-05-16 NOTE — TELEPHONE ENCOUNTER
Patient's daughter called and needs to speak with Dr. Santillan about patient's memory issues. This has been ongoing for a few years. Her daughter is patient's medical power of . Please call her at [phone].

## 2025-05-19 NOTE — TELEPHONE ENCOUNTER
AS PER Terence Parkinson  You42 minutes ago (8:16 AM)     ЕКАТЕРИНА  Spoke with Catherine, patient's daughter. Unfortunately, she lives in [place], but she has two sisters that live here. She's going to call them to see if they can come with patient to appointment.

## 2025-05-19 NOTE — TELEPHONE ENCOUNTER
AS PER DR COLON   Patient should make an appointment with her daughter to discuss memory issues and possible treatments

## 2025-06-17 DIAGNOSIS — E78.00 PURE HYPERCHOLESTEROLEMIA: ICD-10-CM

## 2025-06-17 DIAGNOSIS — I10 ESSENTIAL HYPERTENSION: Primary | ICD-10-CM

## 2025-06-18 DIAGNOSIS — E78.00 PURE HYPERCHOLESTEROLEMIA: ICD-10-CM

## 2025-06-18 DIAGNOSIS — I10 ESSENTIAL HYPERTENSION: ICD-10-CM

## 2025-06-18 LAB
ALBUMIN SERPL-MCNC: 3.2 G/DL (ref 3.2–4.6)
ALBUMIN/GLOB SERPL: 1.1 (ref 1–1.9)
ALP SERPL-CCNC: 81 U/L (ref 35–104)
ALT SERPL-CCNC: 7 U/L (ref 8–45)
ANION GAP SERPL CALC-SCNC: 12 MMOL/L (ref 7–16)
APPEARANCE UR: CLEAR
AST SERPL-CCNC: 20 U/L (ref 15–37)
BASOPHILS # BLD: 0.06 K/UL (ref 0–0.2)
BASOPHILS NFR BLD: 0.5 % (ref 0–2)
BILIRUB SERPL-MCNC: 0.4 MG/DL (ref 0–1.2)
BILIRUB UR QL: NEGATIVE
BUN SERPL-MCNC: 5 MG/DL (ref 8–23)
CALCIUM SERPL-MCNC: 9.3 MG/DL (ref 8.8–10.2)
CHLORIDE SERPL-SCNC: 108 MMOL/L (ref 98–107)
CHOLEST SERPL-MCNC: 188 MG/DL (ref 0–200)
CO2 SERPL-SCNC: 22 MMOL/L (ref 20–29)
COLOR UR: NORMAL
CREAT SERPL-MCNC: 1.06 MG/DL (ref 0.6–1.1)
DIFFERENTIAL METHOD BLD: ABNORMAL
EOSINOPHIL # BLD: 0.08 K/UL (ref 0–0.8)
EOSINOPHIL NFR BLD: 0.7 % (ref 0.5–7.8)
ERYTHROCYTE [DISTWIDTH] IN BLOOD BY AUTOMATED COUNT: 15 % (ref 11.9–14.6)
GLOBULIN SER CALC-MCNC: 2.9 G/DL (ref 2.3–3.5)
GLUCOSE SERPL-MCNC: 84 MG/DL (ref 70–99)
GLUCOSE UR STRIP.AUTO-MCNC: NEGATIVE MG/DL
HCT VFR BLD AUTO: 38.3 % (ref 35.8–46.3)
HDLC SERPL-MCNC: 55 MG/DL (ref 40–60)
HDLC SERPL: 3.4 (ref 0–5)
HGB BLD-MCNC: 12.9 G/DL (ref 11.7–15.4)
HGB UR QL STRIP: NEGATIVE
IMM GRANULOCYTES # BLD AUTO: 0.04 K/UL (ref 0–0.5)
IMM GRANULOCYTES NFR BLD AUTO: 0.4 % (ref 0–5)
KETONES UR QL STRIP.AUTO: NEGATIVE MG/DL
LDLC SERPL CALC-MCNC: 118 MG/DL (ref 0–100)
LEUKOCYTE ESTERASE UR QL STRIP.AUTO: NEGATIVE
LYMPHOCYTES # BLD: 2.32 K/UL (ref 0.5–4.6)
LYMPHOCYTES NFR BLD: 21.1 % (ref 13–44)
MCH RBC QN AUTO: 30.4 PG (ref 26.1–32.9)
MCHC RBC AUTO-ENTMCNC: 33.7 G/DL (ref 31.4–35)
MCV RBC AUTO: 90.1 FL (ref 82–102)
MONOCYTES # BLD: 0.56 K/UL (ref 0.1–1.3)
MONOCYTES NFR BLD: 5.1 % (ref 4–12)
NEUTS SEG # BLD: 7.92 K/UL (ref 1.7–8.2)
NEUTS SEG NFR BLD: 72.2 % (ref 43–78)
NITRITE UR QL STRIP.AUTO: NEGATIVE
NRBC # BLD: 0 K/UL (ref 0–0.2)
PH UR STRIP: 6 (ref 5–9)
PLATELET # BLD AUTO: 364 K/UL (ref 150–450)
PMV BLD AUTO: 10.5 FL (ref 9.4–12.3)
POTASSIUM SERPL-SCNC: 3.4 MMOL/L (ref 3.5–5.1)
PROT SERPL-MCNC: 6.1 G/DL (ref 6.3–8.2)
PROT UR STRIP-MCNC: NEGATIVE MG/DL
RBC # BLD AUTO: 4.25 M/UL (ref 4.05–5.2)
SODIUM SERPL-SCNC: 143 MMOL/L (ref 136–145)
SP GR UR REFRACTOMETRY: 1.01 (ref 1–1.02)
TRIGL SERPL-MCNC: 76 MG/DL (ref 0–150)
TSH, 3RD GENERATION: 1.2 UIU/ML (ref 0.27–4.2)
UROBILINOGEN UR QL STRIP.AUTO: 0.2 EU/DL (ref 0.2–1)
VLDLC SERPL CALC-MCNC: 15 MG/DL (ref 6–23)
WBC # BLD AUTO: 11 K/UL (ref 4.3–11.1)

## 2025-06-19 ENCOUNTER — RESULTS FOLLOW-UP (OUTPATIENT)
Dept: FAMILY MEDICINE CLINIC | Facility: CLINIC | Age: 70
End: 2025-06-19

## 2025-06-24 ENCOUNTER — OFFICE VISIT (OUTPATIENT)
Dept: FAMILY MEDICINE CLINIC | Facility: CLINIC | Age: 70
End: 2025-06-24
Payer: MEDICARE

## 2025-06-24 VITALS
DIASTOLIC BLOOD PRESSURE: 74 MMHG | HEIGHT: 65 IN | SYSTOLIC BLOOD PRESSURE: 126 MMHG | OXYGEN SATURATION: 99 % | HEART RATE: 76 BPM | WEIGHT: 122 LBS | BODY MASS INDEX: 20.33 KG/M2 | TEMPERATURE: 98.2 F

## 2025-06-24 DIAGNOSIS — Z00.00 MEDICARE ANNUAL WELLNESS VISIT, SUBSEQUENT: Primary | ICD-10-CM

## 2025-06-24 DIAGNOSIS — Z87.891 PERSONAL HISTORY OF TOBACCO USE: ICD-10-CM

## 2025-06-24 DIAGNOSIS — Z72.0 TOBACCO ABUSE: ICD-10-CM

## 2025-06-24 DIAGNOSIS — R41.3 MEMORY LOSS: ICD-10-CM

## 2025-06-24 PROBLEM — M79.601 PAIN IN RIGHT ARM: Status: RESOLVED | Noted: 2024-03-04 | Resolved: 2025-06-24

## 2025-06-24 PROBLEM — B37.0 THRUSH, ORAL: Status: RESOLVED | Noted: 2018-06-13 | Resolved: 2025-06-24

## 2025-06-24 PROBLEM — S81.809A OPEN WOUND OF LOWER LEG: Status: RESOLVED | Noted: 2024-08-07 | Resolved: 2025-06-24

## 2025-06-24 PROBLEM — S91.339A PUNCTURE WOUND OF FOOT: Status: RESOLVED | Noted: 2019-07-01 | Resolved: 2025-06-24

## 2025-06-24 PROBLEM — M00.00 STAPHYLOCOCCAL ARTHRITIS (HCC): Status: RESOLVED | Noted: 2025-06-24 | Resolved: 2025-06-24

## 2025-06-24 PROBLEM — M72.2 PLANTAR FASCIITIS OF RIGHT FOOT: Status: RESOLVED | Noted: 2019-03-13 | Resolved: 2025-06-24

## 2025-06-24 PROBLEM — M79.642 PAIN OF LEFT HAND: Status: RESOLVED | Noted: 2023-05-16 | Resolved: 2025-06-24

## 2025-06-24 PROBLEM — M17.10 OSTEOARTHRITIS OF PATELLOFEMORAL JOINT: Status: ACTIVE | Noted: 2018-03-21

## 2025-06-24 PROBLEM — R50.9 FEVER: Status: RESOLVED | Noted: 2017-02-22 | Resolved: 2025-06-24

## 2025-06-24 PROBLEM — M25.539 PAIN IN WRIST: Status: RESOLVED | Noted: 2025-06-24 | Resolved: 2025-06-24

## 2025-06-24 PROBLEM — M46.1 INFLAMMATION OF SACROILIAC JOINT: Status: RESOLVED | Noted: 2017-05-01 | Resolved: 2025-06-24

## 2025-06-24 PROBLEM — C44.311 BASAL CELL CARCINOMA OF NOSE: Status: RESOLVED | Noted: 2025-06-24 | Resolved: 2025-06-24

## 2025-06-24 PROCEDURE — 3078F DIAST BP <80 MM HG: CPT | Performed by: FAMILY MEDICINE

## 2025-06-24 PROCEDURE — G0296 VISIT TO DETERM LDCT ELIG: HCPCS | Performed by: FAMILY MEDICINE

## 2025-06-24 PROCEDURE — 3074F SYST BP LT 130 MM HG: CPT | Performed by: FAMILY MEDICINE

## 2025-06-24 PROCEDURE — 1123F ACP DISCUSS/DSCN MKR DOCD: CPT | Performed by: FAMILY MEDICINE

## 2025-06-24 PROCEDURE — G0439 PPPS, SUBSEQ VISIT: HCPCS | Performed by: FAMILY MEDICINE

## 2025-06-24 PROCEDURE — 1159F MED LIST DOCD IN RCRD: CPT | Performed by: FAMILY MEDICINE

## 2025-06-24 PROCEDURE — 1160F RVW MEDS BY RX/DR IN RCRD: CPT | Performed by: FAMILY MEDICINE

## 2025-06-24 PROCEDURE — 99214 OFFICE O/P EST MOD 30 MIN: CPT | Performed by: FAMILY MEDICINE

## 2025-06-24 RX ORDER — DONEPEZIL HYDROCHLORIDE 5 MG/1
5 TABLET, FILM COATED ORAL NIGHTLY
Qty: 30 TABLET | Refills: 1 | Status: SHIPPED | OUTPATIENT
Start: 2025-06-24

## 2025-06-24 RX ORDER — BUPROPION HYDROCHLORIDE 150 MG/1
TABLET, EXTENDED RELEASE ORAL
Qty: 60 TABLET | Refills: 1 | Status: SHIPPED | OUTPATIENT
Start: 2025-06-24

## 2025-06-24 ASSESSMENT — PATIENT HEALTH QUESTIONNAIRE - PHQ9
SUM OF ALL RESPONSES TO PHQ QUESTIONS 1-9: 0
SUM OF ALL RESPONSES TO PHQ QUESTIONS 1-9: 0
8. MOVING OR SPEAKING SO SLOWLY THAT OTHER PEOPLE COULD HAVE NOTICED. OR THE OPPOSITE, BEING SO FIGETY OR RESTLESS THAT YOU HAVE BEEN MOVING AROUND A LOT MORE THAN USUAL: NOT AT ALL
SUM OF ALL RESPONSES TO PHQ QUESTIONS 1-9: 0
6. FEELING BAD ABOUT YOURSELF - OR THAT YOU ARE A FAILURE OR HAVE LET YOURSELF OR YOUR FAMILY DOWN: NOT AT ALL
7. TROUBLE CONCENTRATING ON THINGS, SUCH AS READING THE NEWSPAPER OR WATCHING TELEVISION: NOT AT ALL
3. TROUBLE FALLING OR STAYING ASLEEP: NOT AT ALL
5. POOR APPETITE OR OVEREATING: NOT AT ALL
1. LITTLE INTEREST OR PLEASURE IN DOING THINGS: NOT AT ALL
SUM OF ALL RESPONSES TO PHQ QUESTIONS 1-9: 0
2. FEELING DOWN, DEPRESSED OR HOPELESS: NOT AT ALL
4. FEELING TIRED OR HAVING LITTLE ENERGY: NOT AT ALL
10. IF YOU CHECKED OFF ANY PROBLEMS, HOW DIFFICULT HAVE THESE PROBLEMS MADE IT FOR YOU TO DO YOUR WORK, TAKE CARE OF THINGS AT HOME, OR GET ALONG WITH OTHER PEOPLE: NOT DIFFICULT AT ALL
9. THOUGHTS THAT YOU WOULD BE BETTER OFF DEAD, OR OF HURTING YOURSELF: NOT AT ALL

## 2025-06-24 ASSESSMENT — LIFESTYLE VARIABLES
HOW OFTEN DO YOU HAVE A DRINK CONTAINING ALCOHOL: MONTHLY OR LESS
HOW MANY STANDARD DRINKS CONTAINING ALCOHOL DO YOU HAVE ON A TYPICAL DAY: PATIENT DOES NOT DRINK
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
## 2025-06-24 NOTE — PATIENT INSTRUCTIONS
20 pack years.  If you smoked 2 packs a day for 10 years, that's 2 times 10. So you have a smoking history of 20 pack years.  Experts agree that screening is for people who have a high risk of lung cancer. But experts don't agree on what high risk means. Some say people age 50 or older with at least a 20-pack-year smoking history are high risk. Others say it's people age 55 or older with a 30-pack-year history.  To see if you could benefit from screening, first find out if you are at high risk for lung cancer. Your doctor can help you decide your lung cancer risk.  What are the risks of screening?  CT screening for lung cancer isn't perfect. It can show an abnormal result when it turns out there wasn't any cancer. This is called a false-positive result. This means you may need more tests to make sure you don't have cancer. These tests can be harmful and cause a lot of worry.  These tests may include more CT scans and invasive testing like a lung biopsy. In a biopsy, the doctor takes a sample of tissue from inside your lung so it can be looked at under a microscope. A biopsy is the only way to tell if you have lung cancer. If the biopsy finds cancer, you and your doctor will have to decide how or whether to treat it.  Some lung cancers found on CT scans are harmless and would not have caused a problem if they had not been found through screening. But because doctors can't tell which ones will turn out to be harmless, most will be treated. This means that you may get treatment--including surgery, radiation, or chemotherapy--that you don't need.  There is a risk of damage to cells or tissue from being exposed to radiation, including the small amounts used in CTs, X-rays, and other medical tests. Over time, exposure to radiation may cause cancer and other health problems. But in most cases, the risk of getting cancer from being exposed to small amounts of radiation is low. It's not a reason to avoid these tests for most

## 2025-07-01 ENCOUNTER — OFFICE VISIT (OUTPATIENT)
Dept: VASCULAR SURGERY | Age: 70
End: 2025-07-01
Payer: MEDICARE

## 2025-07-01 VITALS
DIASTOLIC BLOOD PRESSURE: 86 MMHG | SYSTOLIC BLOOD PRESSURE: 145 MMHG | WEIGHT: 120 LBS | BODY MASS INDEX: 19.99 KG/M2 | OXYGEN SATURATION: 98 % | HEART RATE: 86 BPM | HEIGHT: 65 IN

## 2025-07-01 DIAGNOSIS — I73.9 PVD (PERIPHERAL VASCULAR DISEASE) WITH CLAUDICATION: Primary | ICD-10-CM

## 2025-07-01 PROCEDURE — 99213 OFFICE O/P EST LOW 20 MIN: CPT | Performed by: SURGERY

## 2025-07-01 PROCEDURE — 3079F DIAST BP 80-89 MM HG: CPT | Performed by: SURGERY

## 2025-07-01 PROCEDURE — 3075F SYST BP GE 130 - 139MM HG: CPT | Performed by: SURGERY

## 2025-07-01 PROCEDURE — 1159F MED LIST DOCD IN RCRD: CPT | Performed by: SURGERY

## 2025-07-01 PROCEDURE — 1123F ACP DISCUSS/DSCN MKR DOCD: CPT | Performed by: SURGERY

## 2025-07-01 NOTE — PROGRESS NOTES
[address] [address]  [phone] FAX: [phone]    Keira Grigsby  : 1955    Chief Complaint:     History of Present Illness:   Patient follows up today for follow-up duplex study.  Patient status post left common iliac stent.  Patient denies any claudication or rest pain symptoms.  She has chronic back pain.  She had multiple back surgeries.    CURRENT MEDICATIONS:  Current Outpatient Medications   Medication Sig Dispense Refill    donepezil (ARICEPT) 5 MG tablet Take 1 tablet by mouth nightly 30 tablet 1    Calcium Carbonate 500 MG CHEW Take 2 tablets by mouth nightly      nitroGLYCERIN (NITROSTAT) 0.4 MG SL tablet Place 1 tablet under the tongue every 5 minutes as needed for Chest pain 25 tablet 0    tretinoin (RETIN-A) 0.05 % cream Apply sparingly to affected areas at bedtime 20 minutes after washing face. 20 g 1    hydrocortisone (ANUSOL-HC) 25 MG suppository Place 1 suppository rectally 2 times daily as needed for Hemorrhoids 12 suppository 1    albuterol sulfate HFA (VENTOLIN HFA) 108 (90 Base) MCG/ACT inhaler Inhale 2 puffs into the lungs 4 times daily as needed for Wheezing 18 g 0    aspirin 81 MG EC tablet Take 1 tablet by mouth daily      fexofenadine (ALLEGRA) 180 MG tablet Take 1 tablet by mouth daily      HYDROcodone-acetaminophen (NORCO)  MG per tablet Take 1 tablet by mouth every 6 hours as needed.      rosuvastatin (CRESTOR) 5 MG tablet Take 1 tablet by mouth      buPROPion (WELLBUTRIN SR) 150 MG extended release tablet Take 1 p.o. daily in the morning for 1 week, then 1 p.o. every morning and every dinnertime. (Patient not taking: Reported on 2025) 60 tablet 1    potassium chloride (MICRO-K) 10 MEQ extended release capsule Take 1 capsule by mouth daily (Patient not taking: Reported on 2025)      Multiple Vitamins-Minerals (ONCOVITE) TABS Take 1 tablet by mouth daily (Patient not taking: Reported on 2025)      vitamin D

## 2025-07-28 ENCOUNTER — OFFICE VISIT (OUTPATIENT)
Dept: FAMILY MEDICINE CLINIC | Facility: CLINIC | Age: 70
End: 2025-07-28
Payer: MEDICARE

## 2025-07-28 VITALS
SYSTOLIC BLOOD PRESSURE: 138 MMHG | OXYGEN SATURATION: 99 % | HEIGHT: 65 IN | BODY MASS INDEX: 19.99 KG/M2 | WEIGHT: 120 LBS | DIASTOLIC BLOOD PRESSURE: 80 MMHG | TEMPERATURE: 98.4 F | HEART RATE: 88 BPM

## 2025-07-28 DIAGNOSIS — K21.9 GASTROESOPHAGEAL REFLUX DISEASE WITHOUT ESOPHAGITIS: ICD-10-CM

## 2025-07-28 DIAGNOSIS — R63.4 EXCESSIVE WEIGHT LOSS: ICD-10-CM

## 2025-07-28 DIAGNOSIS — E78.00 PURE HYPERCHOLESTEROLEMIA: ICD-10-CM

## 2025-07-28 DIAGNOSIS — I10 ESSENTIAL HYPERTENSION: ICD-10-CM

## 2025-07-28 DIAGNOSIS — L98.8 AGE-RELATED FACIAL WRINKLES: ICD-10-CM

## 2025-07-28 DIAGNOSIS — F17.210 NICOTINE DEPENDENCE, CIGARETTES, UNCOMPLICATED: ICD-10-CM

## 2025-07-28 DIAGNOSIS — R41.3 MEMORY LOSS: Primary | ICD-10-CM

## 2025-07-28 PROBLEM — Z72.0 TOBACCO ABUSE: Status: RESOLVED | Noted: 2020-01-29 | Resolved: 2025-07-28

## 2025-07-28 LAB
EST. AVERAGE GLUCOSE BLD GHB EST-MCNC: 108 MG/DL
FOLATE SERPL-MCNC: 5.9 NG/ML (ref 3.1–17.5)
HBA1C MFR BLD: 5.4 % (ref 0–5.6)
HIV 1+2 AB+HIV1 P24 AG SERPL QL IA: NONREACTIVE
HIV 1/2 RESULT COMMENT: NORMAL
VIT B12 SERPL-MCNC: <150 PG/ML (ref 193–986)

## 2025-07-28 PROCEDURE — 1159F MED LIST DOCD IN RCRD: CPT | Performed by: FAMILY MEDICINE

## 2025-07-28 PROCEDURE — 3079F DIAST BP 80-89 MM HG: CPT | Performed by: FAMILY MEDICINE

## 2025-07-28 PROCEDURE — 1123F ACP DISCUSS/DSCN MKR DOCD: CPT | Performed by: FAMILY MEDICINE

## 2025-07-28 PROCEDURE — 1126F AMNT PAIN NOTED NONE PRSNT: CPT | Performed by: FAMILY MEDICINE

## 2025-07-28 PROCEDURE — 99214 OFFICE O/P EST MOD 30 MIN: CPT | Performed by: FAMILY MEDICINE

## 2025-07-28 PROCEDURE — 3075F SYST BP GE 130 - 139MM HG: CPT | Performed by: FAMILY MEDICINE

## 2025-07-28 RX ORDER — FAMOTIDINE 20 MG/1
20 TABLET, FILM COATED ORAL EVERY EVENING
Qty: 90 TABLET | Refills: 0 | Status: SHIPPED | OUTPATIENT
Start: 2025-07-28

## 2025-07-28 RX ORDER — AMLODIPINE BESYLATE 5 MG/1
5 TABLET ORAL DAILY
COMMUNITY
End: 2025-07-28 | Stop reason: SDUPTHER

## 2025-07-28 RX ORDER — DONEPEZIL HYDROCHLORIDE 10 MG/1
10 TABLET, FILM COATED ORAL NIGHTLY
Qty: 90 TABLET | Refills: 1 | Status: SHIPPED | OUTPATIENT
Start: 2025-07-28

## 2025-07-28 RX ORDER — MIRTAZAPINE 15 MG/1
15 TABLET, FILM COATED ORAL NIGHTLY
Qty: 90 TABLET | Refills: 0 | Status: SHIPPED | OUTPATIENT
Start: 2025-07-28

## 2025-07-28 RX ORDER — AMLODIPINE BESYLATE 5 MG/1
5 TABLET ORAL DAILY
Qty: 90 TABLET | Refills: 1 | Status: SHIPPED | OUTPATIENT
Start: 2025-07-28 | End: 2026-01-24

## 2025-07-28 RX ORDER — ROSUVASTATIN CALCIUM 5 MG/1
5 TABLET, COATED ORAL DAILY
Qty: 90 TABLET | Refills: 1 | Status: SHIPPED | OUTPATIENT
Start: 2025-07-28 | End: 2026-01-24

## 2025-07-28 RX ORDER — BUPROPION HYDROCHLORIDE 150 MG/1
TABLET, EXTENDED RELEASE ORAL
Qty: 60 TABLET | Refills: 1 | Status: SHIPPED | OUTPATIENT
Start: 2025-07-28

## 2025-07-28 RX ORDER — TRETINOIN 0.5 MG/G
CREAM TOPICAL
Qty: 20 G | Refills: 1 | Status: SHIPPED | OUTPATIENT
Start: 2025-07-28

## 2025-07-28 ASSESSMENT — ENCOUNTER SYMPTOMS
NAUSEA: 0
COUGH: 0
VOMITING: 0
ABDOMINAL DISTENTION: 0
ABDOMINAL PAIN: 0
SHORTNESS OF BREATH: 0
SORE THROAT: 0

## 2025-07-28 ASSESSMENT — PATIENT HEALTH QUESTIONNAIRE - PHQ9
SUM OF ALL RESPONSES TO PHQ QUESTIONS 1-9: 0
2. FEELING DOWN, DEPRESSED OR HOPELESS: NOT AT ALL
SUM OF ALL RESPONSES TO PHQ QUESTIONS 1-9: 0
1. LITTLE INTEREST OR PLEASURE IN DOING THINGS: NOT AT ALL
SUM OF ALL RESPONSES TO PHQ QUESTIONS 1-9: 0
SUM OF ALL RESPONSES TO PHQ QUESTIONS 1-9: 0

## 2025-07-29 NOTE — PROGRESS NOTES
Keira Grigsby (:  1955) is a 70 y.o. female,Established patient, here for evaluation of the following chief complaint(s):  Medication Refill         Assessment & Plan  Memory loss   Chronic, not at goal (unstable), changes made today: Patient's dose of donepezil was increased to 10 mg, 1 p.o. daily at bedtime and medication adherence emphasized    Orders:    donepezil (ARICEPT) 10 MG tablet; Take 1 tablet by mouth nightly    Essential hypertension   Chronic, at goal (stable), continue current treatment plan, medication adherence emphasized, and lifestyle modifications recommended    Orders:    amLODIPine (NORVASC) 5 MG tablet; Take 1 tablet by mouth daily    Pure hypercholesterolemia   Chronic, at goal (stable), continue current treatment plan, medication adherence emphasized, and lifestyle modifications recommended    Orders:    rosuvastatin (CRESTOR) 5 MG tablet; Take 1 tablet by mouth daily    Gastroesophageal reflux disease without esophagitis   Chronic, at goal (stable), continue current treatment plan, medication adherence emphasized, and lifestyle modifications recommended    Orders:    famotidine (PEPCID) 20 MG tablet; Take 1 tablet by mouth every evening    Excessive weight loss   Chronic, not at goal (unstable), changes made today: Patient was started on mirtazapine 15 mg, 1 p.o. daily at bedtime and medication adherence emphasized    Orders:    mirtazapine (REMERON) 15 MG tablet; Take 1 tablet by mouth nightly    Nicotine dependence, cigarettes, uncomplicated   Chronic, not at goal (unstable), changes made today: Patient was given a prescription for bupropion to help with cigarette nicotine dependence cessation and medication adherence emphasized           No follow-ups on file.       Subjective   Medication Refill  Associated symptoms include arthralgias. Pertinent negatives include no abdominal pain, chest pain, congestion, coughing, fatigue, nausea, rash, sore throat or vomiting.

## 2025-07-29 NOTE — ASSESSMENT & PLAN NOTE
Chronic, at goal (stable), continue current treatment plan, medication adherence emphasized, and lifestyle modifications recommended    Orders:    amLODIPine (NORVASC) 5 MG tablet; Take 1 tablet by mouth daily    
 Chronic, at goal (stable), continue current treatment plan, medication adherence emphasized, and lifestyle modifications recommended    Orders:    famotidine (PEPCID) 20 MG tablet; Take 1 tablet by mouth every evening    
 Chronic, at goal (stable), continue current treatment plan, medication adherence emphasized, and lifestyle modifications recommended    Orders:    rosuvastatin (CRESTOR) 5 MG tablet; Take 1 tablet by mouth daily    
 Chronic, not at goal (unstable), changes made today: Patient was given a prescription for bupropion to help with cigarette nicotine dependence cessation and medication adherence emphasized         
 Chronic, not at goal (unstable), changes made today: Patient was started on mirtazapine 15 mg, 1 p.o. daily at bedtime and medication adherence emphasized    Orders:    mirtazapine (REMERON) 15 MG tablet; Take 1 tablet by mouth nightly    
 Chronic, not at goal (unstable), changes made today: Patient's dose of donepezil was increased to 10 mg, 1 p.o. daily at bedtime and medication adherence emphasized    Orders:    donepezil (ARICEPT) 10 MG tablet; Take 1 tablet by mouth nightly    
N/A

## 2025-08-08 ENCOUNTER — HOSPITAL ENCOUNTER (OUTPATIENT)
Age: 70
Discharge: HOME | End: 2025-08-10
Attending: FAMILY MEDICINE
Payer: OTHER GOVERNMENT

## 2025-08-08 DIAGNOSIS — R41.3 MEMORY LOSS: ICD-10-CM

## 2025-08-08 PROCEDURE — 70551 MRI BRAIN STEM W/O DYE: CPT

## 2025-08-29 ENCOUNTER — OFFICE VISIT (OUTPATIENT)
Dept: FAMILY MEDICINE CLINIC | Facility: CLINIC | Age: 70
End: 2025-08-29

## 2025-08-29 VITALS
WEIGHT: 118 LBS | OXYGEN SATURATION: 97 % | BODY MASS INDEX: 19.66 KG/M2 | HEIGHT: 65 IN | HEART RATE: 91 BPM | DIASTOLIC BLOOD PRESSURE: 68 MMHG | SYSTOLIC BLOOD PRESSURE: 110 MMHG | TEMPERATURE: 98.9 F

## 2025-08-29 DIAGNOSIS — R41.3 MEMORY LOSS: Primary | ICD-10-CM

## 2025-08-29 DIAGNOSIS — K58.0 IRRITABLE BOWEL SYNDROME WITH DIARRHEA: ICD-10-CM

## 2025-08-29 DIAGNOSIS — L85.3 XEROSIS CUTIS: ICD-10-CM

## 2025-08-29 RX ORDER — COLESTIPOL HYDROCHLORIDE 1 G/1
1 TABLET ORAL DAILY
COMMUNITY
Start: 2023-09-11 | End: 2025-08-29 | Stop reason: SDUPTHER

## 2025-08-29 RX ORDER — AMMONIUM LACTATE 5 %
1 LOTION (GRAM) TOPICAL PRN
Qty: 222 ML | Refills: 1 | Status: SHIPPED | OUTPATIENT
Start: 2025-08-29

## 2025-08-29 RX ORDER — COLESTIPOL HYDROCHLORIDE 1 G/1
1 TABLET ORAL 2 TIMES DAILY
Qty: 180 TABLET | Refills: 1 | Status: SHIPPED | OUTPATIENT
Start: 2025-08-29 | End: 2026-02-25

## 2025-08-29 RX ORDER — DONEPEZIL HYDROCHLORIDE 10 MG/1
10 TABLET, FILM COATED ORAL NIGHTLY
Qty: 90 TABLET | Refills: 1 | Status: SHIPPED | OUTPATIENT
Start: 2025-08-29

## 2025-08-30 ASSESSMENT — ENCOUNTER SYMPTOMS
SHORTNESS OF BREATH: 0
NAUSEA: 0
SORE THROAT: 0
CONSTIPATION: 0
DIARRHEA: 0
BACK PAIN: 1
VOMITING: 0